# Patient Record
Sex: FEMALE | Race: BLACK OR AFRICAN AMERICAN | NOT HISPANIC OR LATINO | Employment: OTHER | URBAN - METROPOLITAN AREA
[De-identification: names, ages, dates, MRNs, and addresses within clinical notes are randomized per-mention and may not be internally consistent; named-entity substitution may affect disease eponyms.]

---

## 2018-08-30 ENCOUNTER — HOSPITAL ENCOUNTER (EMERGENCY)
Facility: HOSPITAL | Age: 38
Discharge: HOME | End: 2018-08-30
Attending: EMERGENCY MEDICINE
Payer: COMMERCIAL

## 2018-08-30 VITALS
HEART RATE: 80 BPM | DIASTOLIC BLOOD PRESSURE: 68 MMHG | SYSTOLIC BLOOD PRESSURE: 115 MMHG | BODY MASS INDEX: 42.65 KG/M2 | RESPIRATION RATE: 18 BRPM | OXYGEN SATURATION: 100 % | TEMPERATURE: 98 F | HEIGHT: 64 IN | WEIGHT: 249.81 LBS

## 2018-08-30 DIAGNOSIS — K59.00 CONSTIPATION, UNSPECIFIED CONSTIPATION TYPE: Primary | ICD-10-CM

## 2018-08-30 DIAGNOSIS — R10.84 GENERALIZED ABDOMINAL PAIN: ICD-10-CM

## 2018-08-30 LAB
B-HCG UR QL: NEGATIVE
BILIRUB UR QL STRIP: NEGATIVE
CLARITY UR: CLEAR
COLOR UR: YELLOW
GLUCOSE UR QL STRIP: NEGATIVE
HGB UR QL STRIP: ABNORMAL
KETONES UR QL STRIP: ABNORMAL
LEUKOCYTE ESTERASE UR QL STRIP: NEGATIVE
NITRITE UR QL STRIP: NEGATIVE
PH UR STRIP: 6 [PH] (ref 5–8)
PROT UR QL STRIP: NEGATIVE
SP GR UR STRIP: 1.02 (ref 1–1.03)
URN SPEC COLLECT METH UR: ABNORMAL
UROBILINOGEN UR STRIP-ACNC: 1 EU/DL

## 2018-08-30 PROCEDURE — 99284 EMERGENCY DEPT VISIT MOD MDM: CPT | Mod: 25

## 2018-08-30 PROCEDURE — 81003 URINALYSIS AUTO W/O SCOPE: CPT

## 2018-08-30 PROCEDURE — 81025 URINE PREGNANCY TEST: CPT

## 2018-08-30 RX ORDER — POLYETHYLENE GLYCOL 3350 17 G/17G
17 POWDER, FOR SOLUTION ORAL DAILY
Qty: 238 G | Refills: 0 | Status: ON HOLD | OUTPATIENT
Start: 2018-08-30 | End: 2018-09-10 | Stop reason: HOSPADM

## 2018-08-30 RX ORDER — DOCUSATE SODIUM 100 MG/1
100 CAPSULE, LIQUID FILLED ORAL 2 TIMES DAILY
Qty: 60 CAPSULE | Refills: 0 | Status: SHIPPED | OUTPATIENT
Start: 2018-08-30

## 2018-08-30 RX ORDER — OLMESARTAN MEDOXOMIL AND HYDROCHLOROTHIAZIDE 40/25 40; 25 MG/1; MG/1
1 TABLET ORAL DAILY
COMMUNITY

## 2018-08-30 NOTE — ED PROVIDER NOTES
SCRIBE #1 NOTE: I, Gertrude Mittal, am scribing for, and in the presence of, Madelin Nation PA-C. I have scribed the entire note.      History      Chief Complaint   Patient presents with    Abdominal Pain     pt c/o generalized abd pain, 1 episode vomiting, last BM today, denies dysuria       Review of patient's allergies indicates:  No Known Allergies     HPI   HPI    2018, 3:52 PM   History obtained from the patient      History of Present Illness: Lulú Seth is a 37 y.o. female patient who presents to the Emergency Department for abd pain which onset gradually 12 hours PTA. Pt describes the pain as a cramp. Pt states she had nachos for dinner last night. Symptoms are constant and moderate in severity. No mitigating or exacerbating factors reported. Associated sxs include 1 episode of N/V. Patient denies any fever, chills, diarrhea, hematochezia, constipation, hematuria, dysuria, difficulty urinating, and all other sxs at this time. No prior tx. No further complaints or concerns at this time.       Arrival mode: Personal vehicle     PCP: [facility]       Past Medical History:  Past Medical History:   Diagnosis Date    Hypertension        Past Surgical History:  Past Surgical History:   Procedure Laterality Date     SECTION, CLASSIC      x1    DILATION AND CURETTAGE OF UTERUS           Family History:  Family History   Problem Relation Age of Onset    Hypertension Unknown     Diabetes Unknown        Social History:  Social History     Tobacco Use    Smoking status: Never Smoker    Smokeless tobacco: Never Used   Substance and Sexual Activity    Alcohol use: Yes     Comment: occasion     Drug use: No    Sexual activity: Yes     Partners: Male       ROS   Review of Systems   Constitutional: Negative for chills, diaphoresis and fever.   HENT: Negative for congestion, rhinorrhea and sore throat.    Respiratory: Negative for shortness of breath.    Cardiovascular: Negative  "for chest pain.   Gastrointestinal: Positive for abdominal pain, nausea and vomiting. Negative for blood in stool, constipation and diarrhea.   Genitourinary: Negative for difficulty urinating, dysuria, frequency and hematuria.   Musculoskeletal: Negative for back pain and neck pain.   Skin: Negative for rash.   Neurological: Negative for dizziness, weakness, numbness and headaches.   Hematological: Does not bruise/bleed easily.     Physical Exam      Initial Vitals [08/30/18 1541]   BP Pulse Resp Temp SpO2   (!) 111/54 67 16 98.2 °F (36.8 °C) 97 %      MAP       --          Physical Exam  Nursing Notes and Vital Signs Reviewed.  Constitutional: Patient is in no acute distress. Well-developed and well-nourished.  Head: Atraumatic. Normocephalic.  Eyes: PERRL. EOM intact. Conjunctivae are not pale. No scleral icterus.  ENT: Mucous membranes are moist. Oropharynx is clear and symmetric.    Neck: Supple. Full ROM. No lymphadenopathy.  Cardiovascular: Regular rate. Regular rhythm. No murmurs, rubs, or gallops. Distal pulses are 2+ and symmetric.  Pulmonary/Chest: No respiratory distress. Clear to auscultation bilaterally. No wheezing or rales.  Abdominal: Soft and non-distended.  There is no tenderness.  No rebound, guarding, or rigidity.   Genitourinary: No CVA tenderness.  Rectal:  Patient deferred.  Musculoskeletal: Moves all extremities. No obvious deformities. No edema.  Skin: Warm and dry.  Neurological:  Alert, awake, and appropriate.  Normal speech.  No acute focal neurological deficits are appreciated.  Psychiatric: Normal affect. Good eye contact. Appropriate in content.    ED Course    Procedures  ED Vital Signs:  Vitals:    08/30/18 1541 08/30/18 1748   BP: (!) 111/54 115/68   Pulse: 67 80   Resp: 16 18   Temp: 98.2 °F (36.8 °C)    TempSrc: Oral    SpO2: 97% 100%   Weight: 113.3 kg (249 lb 12.5 oz)    Height: 5' 4" (1.626 m)        Abnormal Lab Results:  Labs Reviewed   URINALYSIS, REFLEX TO URINE CULTURE - " Abnormal; Notable for the following components:       Result Value    Ketones, UA Trace (*)     Occult Blood UA Trace (*)     All other components within normal limits    Narrative:     Preferred Collection Type->Urine, Clean Catch   PREGNANCY TEST, URINE RAPID        All Lab Results:  Results for orders placed or performed during the hospital encounter of 08/30/18   Urinalysis, Reflex to Urine Culture Urine, Clean Catch   Result Value Ref Range    Specimen UA Urine, Clean Catch     Color, UA Yellow Yellow, Straw, Patsy    Appearance, UA Clear Clear    pH, UA 6.0 5.0 - 8.0    Specific Gravity, UA 1.025 1.005 - 1.030    Protein, UA Negative Negative    Glucose, UA Negative Negative    Ketones, UA Trace (A) Negative    Bilirubin (UA) Negative Negative    Occult Blood UA Trace (A) Negative    Nitrite, UA Negative Negative    Urobilinogen, UA 1.0 <2.0 EU/dL    Leukocytes, UA Negative Negative   Rapid Pregnancy, Urine   Result Value Ref Range    Preg Test, Ur Negative          Imaging Results:  Imaging Results          X-Ray Abdomen Flat And Erect (Final result)  Result time 08/30/18 17:24:30    Final result by Andres Menchaca MD (08/30/18 17:24:30)                 Impression:      Moderate constipation.      Electronically signed by: Andres Menchaca MD  Date:    08/30/2018  Time:    17:24             Narrative:    EXAMINATION:  XR ABDOMEN FLAT AND ERECT    CLINICAL HISTORY:  Generalized abdominal pain    TECHNIQUE:  3 View of the abdomen was performed.    COMPARISON:  None    FINDINGS:  Nonobstructive bowel gas pattern.  Moderate constipation.    No obvious free air.  No portal venous gas.    No acute fracture. Extensive pelvic phleboliths.  Lung bases are clear.                                        The Emergency Provider reviewed the vital signs and test results, which are outlined above.    ED Discussion     5:36 PM Reassessed pt at this time. Pt declined rectal exam at this time. Pt is awake, alert, and in NAD at  this time. Discussed with pt all pertinent ED information and results. Discussed pt dx and plan of tx. Gave pt all f/u and return to the ED instructions. All questions and concerns were addressed at this time. Pt expresses understanding of information and instructions, and is comfortable with plan to discharge. Pt is stable for discharge.    I discussed with patient and/or family/caretaker that evaluation in the ED does not suggest any emergent or life threatening medical conditions requiring immediate intervention beyond what was provided in the ED, and I believe patient is safe for discharge.  Regardless, an unremarkable evaluation in the ED does not preclude the development or presence of a serious of life threatening condition. As such, patient was instructed to return immediately for any worsening or change in current symptoms.      ED Medication(s):  Medications - No data to display    Discharge Medication List as of 8/30/2018  5:41 PM      START taking these medications    Details   docusate sodium (COLACE) 100 MG capsule Take 1 capsule (100 mg total) by mouth 2 (two) times daily., Starting u 8/30/2018, Normal      polyethylene glycol (GLYCOLAX) 17 gram/dose powder Take 17 g by mouth once daily., Starting Thu 8/30/2018, Normal             Follow-up Information     [facility]. Schedule an appointment as soon as possible for a visit in 3 days.                   Medical Decision Making    Medical Decision Making:   Clinical Tests:   Lab Tests: Ordered and Reviewed  Radiological Study: Ordered and Reviewed           Scribe Attestation:   Scribe #1: I performed the above scribed service and the documentation accurately describes the services I performed. I attest to the accuracy of the note.    Attending:   Physician Attestation Statement for Scribe #1: I, Madelin Nation PA-C, personally performed the services described in this documentation, as scribed by Gertrude Mittal, in my presence, and it is both  accurate and complete.          Clinical Impression       ICD-10-CM ICD-9-CM   1. Constipation, unspecified constipation type K59.00 564.00   2. Generalized abdominal pain R10.84 789.07       Disposition:   Disposition: Discharged  Condition: Stable         Madelin Nation PA-C  09/01/18 1306

## 2018-09-07 ENCOUNTER — HOSPITAL ENCOUNTER (INPATIENT)
Facility: HOSPITAL | Age: 38
LOS: 3 days | Discharge: HOME HEALTH | DRG: 391 | End: 2018-09-10
Attending: EMERGENCY MEDICINE | Admitting: SURGERY
Payer: COMMERCIAL

## 2018-09-07 DIAGNOSIS — R10.30 LOWER ABDOMINAL PAIN: ICD-10-CM

## 2018-09-07 DIAGNOSIS — I10 ESSENTIAL HYPERTENSION: ICD-10-CM

## 2018-09-07 DIAGNOSIS — E87.6 HYPOKALEMIA: ICD-10-CM

## 2018-09-07 DIAGNOSIS — K57.20 DIVERTICULITIS OF COLON WITH PERFORATION: ICD-10-CM

## 2018-09-07 DIAGNOSIS — K57.20 DIVERTICULITIS OF LARGE INTESTINE WITH PERFORATION WITHOUT BLEEDING: Primary | ICD-10-CM

## 2018-09-07 DIAGNOSIS — K65.1 PERITONEAL ABSCESS: ICD-10-CM

## 2018-09-07 DIAGNOSIS — K65.1 INTRA-ABDOMINAL ABSCESS: ICD-10-CM

## 2018-09-07 PROBLEM — N73.9 PELVIC ABSCESS IN FEMALE: Status: ACTIVE | Noted: 2018-09-07

## 2018-09-07 LAB
ALBUMIN SERPL BCP-MCNC: 2.3 G/DL
ALP SERPL-CCNC: 169 U/L
ALT SERPL W/O P-5'-P-CCNC: 30 U/L
ANION GAP SERPL CALC-SCNC: 11 MMOL/L
ANION GAP SERPL CALC-SCNC: 14 MMOL/L
ANISOCYTOSIS BLD QL SMEAR: SLIGHT
AST SERPL-CCNC: 19 U/L
BASOPHILS # BLD AUTO: 0.03 K/UL
BASOPHILS NFR BLD: 0.2 %
BILIRUB SERPL-MCNC: 0.3 MG/DL
BUN SERPL-MCNC: 14 MG/DL
BUN SERPL-MCNC: 17 MG/DL
CALCIUM SERPL-MCNC: 8.7 MG/DL
CALCIUM SERPL-MCNC: 8.9 MG/DL
CHLORIDE SERPL-SCNC: 97 MMOL/L
CHLORIDE SERPL-SCNC: 99 MMOL/L
CO2 SERPL-SCNC: 27 MMOL/L
CO2 SERPL-SCNC: 31 MMOL/L
CREAT SERPL-MCNC: 1 MG/DL
CREAT SERPL-MCNC: 1.1 MG/DL
DIFFERENTIAL METHOD: ABNORMAL
EOSINOPHIL # BLD AUTO: 0.2 K/UL
EOSINOPHIL NFR BLD: 0.9 %
ERYTHROCYTE [DISTWIDTH] IN BLOOD BY AUTOMATED COUNT: 17.1 %
EST. GFR  (AFRICAN AMERICAN): >60 ML/MIN/1.73 M^2
EST. GFR  (AFRICAN AMERICAN): >60 ML/MIN/1.73 M^2
EST. GFR  (NON AFRICAN AMERICAN): >60 ML/MIN/1.73 M^2
EST. GFR  (NON AFRICAN AMERICAN): >60 ML/MIN/1.73 M^2
GLUCOSE SERPL-MCNC: 105 MG/DL
GLUCOSE SERPL-MCNC: 117 MG/DL
GRAM STN SPEC: NORMAL
HCT VFR BLD AUTO: 25.9 %
HGB BLD-MCNC: 8.3 G/DL
LIPASE SERPL-CCNC: 14 U/L
LYMPHOCYTES # BLD AUTO: 1.9 K/UL
LYMPHOCYTES NFR BLD: 10.5 %
MAGNESIUM SERPL-MCNC: 2.7 MG/DL
MCH RBC QN AUTO: 24.5 PG
MCHC RBC AUTO-ENTMCNC: 32 G/DL
MCV RBC AUTO: 76 FL
MONOCYTES # BLD AUTO: 1.7 K/UL
MONOCYTES NFR BLD: 9.5 %
NEUTROPHILS # BLD AUTO: 13.9 K/UL
NEUTROPHILS NFR BLD: 79.7 %
OVALOCYTES BLD QL SMEAR: ABNORMAL
PLATELET # BLD AUTO: 534 K/UL
PMV BLD AUTO: 8.9 FL
POCT GLUCOSE: 117 MG/DL (ref 70–110)
POIKILOCYTOSIS BLD QL SMEAR: SLIGHT
POTASSIUM SERPL-SCNC: 2.6 MMOL/L
POTASSIUM SERPL-SCNC: 3.1 MMOL/L
PROT SERPL-MCNC: 8 G/DL
RBC # BLD AUTO: 3.39 M/UL
SODIUM SERPL-SCNC: 138 MMOL/L
SODIUM SERPL-SCNC: 141 MMOL/L
WBC # BLD AUTO: 17.55 K/UL

## 2018-09-07 PROCEDURE — 82962 GLUCOSE BLOOD TEST: CPT

## 2018-09-07 PROCEDURE — 25000003 PHARM REV CODE 250: Performed by: NURSE PRACTITIONER

## 2018-09-07 PROCEDURE — 80048 BASIC METABOLIC PNL TOTAL CA: CPT

## 2018-09-07 PROCEDURE — 96361 HYDRATE IV INFUSION ADD-ON: CPT

## 2018-09-07 PROCEDURE — 87076 CULTURE ANAEROBE IDENT EACH: CPT

## 2018-09-07 PROCEDURE — S5010 5% DEXTROSE AND 0.45% SALINE: HCPCS | Performed by: NURSE PRACTITIONER

## 2018-09-07 PROCEDURE — 25000003 PHARM REV CODE 250: Performed by: SURGERY

## 2018-09-07 PROCEDURE — 87205 SMEAR GRAM STAIN: CPT

## 2018-09-07 PROCEDURE — 99900037 HC PT THERAPY SCREENING (STAT)

## 2018-09-07 PROCEDURE — 99222 1ST HOSP IP/OBS MODERATE 55: CPT | Mod: ,,, | Performed by: SURGERY

## 2018-09-07 PROCEDURE — 87070 CULTURE OTHR SPECIMN AEROBIC: CPT

## 2018-09-07 PROCEDURE — 83735 ASSAY OF MAGNESIUM: CPT

## 2018-09-07 PROCEDURE — 85025 COMPLETE CBC W/AUTO DIFF WBC: CPT

## 2018-09-07 PROCEDURE — 96365 THER/PROPH/DIAG IV INF INIT: CPT

## 2018-09-07 PROCEDURE — 87075 CULTR BACTERIA EXCEPT BLOOD: CPT

## 2018-09-07 PROCEDURE — 99285 EMERGENCY DEPT VISIT HI MDM: CPT | Mod: 25

## 2018-09-07 PROCEDURE — 96375 TX/PRO/DX INJ NEW DRUG ADDON: CPT

## 2018-09-07 PROCEDURE — 63600175 PHARM REV CODE 636 W HCPCS: Performed by: NURSE PRACTITIONER

## 2018-09-07 PROCEDURE — 80053 COMPREHEN METABOLIC PANEL: CPT

## 2018-09-07 PROCEDURE — 87186 SC STD MICRODIL/AGAR DIL: CPT

## 2018-09-07 PROCEDURE — 63600175 PHARM REV CODE 636 W HCPCS: Performed by: RADIOLOGY

## 2018-09-07 PROCEDURE — 0W9G30Z DRAINAGE OF PERITONEAL CAVITY WITH DRAINAGE DEVICE, PERCUTANEOUS APPROACH: ICD-10-PCS | Performed by: RADIOLOGY

## 2018-09-07 PROCEDURE — 11000001 HC ACUTE MED/SURG PRIVATE ROOM

## 2018-09-07 PROCEDURE — 83690 ASSAY OF LIPASE: CPT

## 2018-09-07 PROCEDURE — 87077 CULTURE AEROBIC IDENTIFY: CPT | Mod: 59

## 2018-09-07 PROCEDURE — 36415 COLL VENOUS BLD VENIPUNCTURE: CPT

## 2018-09-07 RX ORDER — POTASSIUM CHLORIDE 20 MEQ/1
60 TABLET, EXTENDED RELEASE ORAL ONCE
Status: COMPLETED | OUTPATIENT
Start: 2018-09-07 | End: 2018-09-07

## 2018-09-07 RX ORDER — HYDROCODONE BITARTRATE AND ACETAMINOPHEN 5; 325 MG/1; MG/1
2 TABLET ORAL EVERY 4 HOURS PRN
Status: DISCONTINUED | OUTPATIENT
Start: 2018-09-07 | End: 2018-09-10 | Stop reason: HOSPADM

## 2018-09-07 RX ORDER — ACETAMINOPHEN 325 MG/1
650 TABLET ORAL EVERY 4 HOURS PRN
Status: DISCONTINUED | OUTPATIENT
Start: 2018-09-07 | End: 2018-09-10 | Stop reason: HOSPADM

## 2018-09-07 RX ORDER — RAMELTEON 8 MG/1
8 TABLET ORAL NIGHTLY PRN
Status: DISCONTINUED | OUTPATIENT
Start: 2018-09-07 | End: 2018-09-10 | Stop reason: HOSPADM

## 2018-09-07 RX ORDER — ONDANSETRON 2 MG/ML
4 INJECTION INTRAMUSCULAR; INTRAVENOUS EVERY 12 HOURS PRN
Status: DISCONTINUED | OUTPATIENT
Start: 2018-09-07 | End: 2018-09-10 | Stop reason: HOSPADM

## 2018-09-07 RX ORDER — LIDOCAINE HYDROCHLORIDE 10 MG/ML
1 INJECTION, SOLUTION EPIDURAL; INFILTRATION; INTRACAUDAL; PERINEURAL ONCE
Status: DISCONTINUED | OUTPATIENT
Start: 2018-09-07 | End: 2018-09-10 | Stop reason: HOSPADM

## 2018-09-07 RX ORDER — HYDRALAZINE HYDROCHLORIDE 20 MG/ML
10 INJECTION INTRAMUSCULAR; INTRAVENOUS EVERY 6 HOURS PRN
Status: DISCONTINUED | OUTPATIENT
Start: 2018-09-07 | End: 2018-09-10 | Stop reason: HOSPADM

## 2018-09-07 RX ORDER — DIPHENHYDRAMINE HYDROCHLORIDE 50 MG/ML
25 INJECTION INTRAMUSCULAR; INTRAVENOUS EVERY 4 HOURS PRN
Status: DISCONTINUED | OUTPATIENT
Start: 2018-09-07 | End: 2018-09-10 | Stop reason: HOSPADM

## 2018-09-07 RX ORDER — MIDAZOLAM HYDROCHLORIDE 1 MG/ML
INJECTION INTRAMUSCULAR; INTRAVENOUS CODE/TRAUMA/SEDATION MEDICATION
Status: COMPLETED | OUTPATIENT
Start: 2018-09-07 | End: 2018-09-07

## 2018-09-07 RX ORDER — SODIUM CHLORIDE 9 MG/ML
1000 INJECTION, SOLUTION INTRAVENOUS
Status: COMPLETED | OUTPATIENT
Start: 2018-09-07 | End: 2018-09-07

## 2018-09-07 RX ORDER — SODIUM CHLORIDE, SODIUM LACTATE, POTASSIUM CHLORIDE, CALCIUM CHLORIDE 600; 310; 30; 20 MG/100ML; MG/100ML; MG/100ML; MG/100ML
INJECTION, SOLUTION INTRAVENOUS CONTINUOUS
Status: DISCONTINUED | OUTPATIENT
Start: 2018-09-07 | End: 2018-09-08

## 2018-09-07 RX ORDER — ENOXAPARIN SODIUM 100 MG/ML
40 INJECTION SUBCUTANEOUS EVERY 24 HOURS
Status: DISCONTINUED | OUTPATIENT
Start: 2018-09-08 | End: 2018-09-10 | Stop reason: HOSPADM

## 2018-09-07 RX ORDER — MORPHINE SULFATE 4 MG/ML
2 INJECTION, SOLUTION INTRAMUSCULAR; INTRAVENOUS
Status: DISCONTINUED | OUTPATIENT
Start: 2018-09-07 | End: 2018-09-10 | Stop reason: HOSPADM

## 2018-09-07 RX ORDER — FENTANYL CITRATE 50 UG/ML
INJECTION, SOLUTION INTRAMUSCULAR; INTRAVENOUS CODE/TRAUMA/SEDATION MEDICATION
Status: COMPLETED | OUTPATIENT
Start: 2018-09-07 | End: 2018-09-07

## 2018-09-07 RX ORDER — GLUCAGON 1 MG
1 KIT INJECTION
Status: DISCONTINUED | OUTPATIENT
Start: 2018-09-07 | End: 2018-09-10 | Stop reason: HOSPADM

## 2018-09-07 RX ADMIN — SODIUM CHLORIDE 1000 ML: 0.9 INJECTION, SOLUTION INTRAVENOUS at 09:09

## 2018-09-07 RX ADMIN — MIDAZOLAM HYDROCHLORIDE 1 MG: 1 INJECTION, SOLUTION INTRAMUSCULAR; INTRAVENOUS at 02:09

## 2018-09-07 RX ADMIN — POTASSIUM CHLORIDE 60 MEQ: 1500 TABLET, EXTENDED RELEASE ORAL at 10:09

## 2018-09-07 RX ADMIN — PIPERACILLIN AND TAZOBACTAM 4.5 G: 4; .5 INJECTION, POWDER, LYOPHILIZED, FOR SOLUTION INTRAVENOUS; PARENTERAL at 11:09

## 2018-09-07 RX ADMIN — FENTANYL CITRATE 50 MCG: 50 INJECTION, SOLUTION INTRAMUSCULAR; INTRAVENOUS at 02:09

## 2018-09-07 RX ADMIN — POTASSIUM CHLORIDE: 2 INJECTION, SOLUTION, CONCENTRATE INTRAVENOUS at 12:09

## 2018-09-07 RX ADMIN — HYDROCODONE BITARTRATE AND ACETAMINOPHEN 2 TABLET: 5; 325 TABLET ORAL at 04:09

## 2018-09-07 RX ADMIN — SODIUM CHLORIDE, SODIUM LACTATE, POTASSIUM CHLORIDE, AND CALCIUM CHLORIDE: .6; .31; .03; .02 INJECTION, SOLUTION INTRAVENOUS at 01:09

## 2018-09-07 RX ADMIN — HYDROCODONE BITARTRATE AND ACETAMINOPHEN 2 TABLET: 5; 325 TABLET ORAL at 11:09

## 2018-09-07 NOTE — SEDATION DOCUMENTATION
Pt lying on ct table prone with bilateral arms above head. Pt vss, cardiac monitoring in place. Pt verbalized understanding and all questions answered.

## 2018-09-07 NOTE — ED NOTES
From or to room. Ricardo drain located low back region on left lumbar area, secured, no drainage, no seepage. Patient lying on right side in no discomfort, pt states that she is in no pain at this time.

## 2018-09-07 NOTE — ASSESSMENT & PLAN NOTE
Admit for perforated diverticulitis with intra-abdominal abscess less likely perforated appendicitis  NPO, IV fluids, IV antibiotics  IR drainage of pelvic abscess  Discussed with patient that if condition worsens or fails to improve may require surgery. Risks and benefits of observation as well as surgery discussed with patient. Patient agrees with plan.

## 2018-09-07 NOTE — HPI
37-year-old female referred for pelvic abscess.  The patient reports lower pelvic abdominal pain for a week.  She endorses intermittent constipation with passing some liquid bowel movements.  She denies any fevers, chills, urinary difficulties.

## 2018-09-07 NOTE — SUBJECTIVE & OBJECTIVE
Past Medical History:   Diagnosis Date    Hypertension        Past Surgical History:   Procedure Laterality Date     SECTION, CLASSIC      x1    DILATION AND CURETTAGE OF UTERUS         Review of patient's allergies indicates:  No Known Allergies    No current facility-administered medications on file prior to encounter.      Current Outpatient Medications on File Prior to Encounter   Medication Sig    docusate sodium (COLACE) 100 MG capsule Take 1 capsule (100 mg total) by mouth 2 (two) times daily.    olmesartan-hydrochlorothiazide (BENICAR HCT) 40-25 mg per tablet Take 1 tablet by mouth once daily.    polyethylene glycol (GLYCOLAX) 17 gram/dose powder Take 17 g by mouth once daily.     Family History     Problem Relation (Age of Onset)    Diabetes     Hypertension         Tobacco Use    Smoking status: Never Smoker    Smokeless tobacco: Never Used   Substance and Sexual Activity    Alcohol use: Yes     Comment: occasion     Drug use: No    Sexual activity: Yes     Partners: Male     Review of Systems   Constitutional: Positive for activity change. Negative for chills and fever.   HENT: Negative.    Eyes: Negative.    Respiratory: Negative for apnea, cough, choking, chest tightness, shortness of breath, wheezing and stridor.    Cardiovascular: Negative for chest pain, palpitations and leg swelling.   Gastrointestinal: Negative for abdominal pain, constipation, diarrhea, nausea and vomiting.   Endocrine: Negative.    Genitourinary: Negative.    Musculoskeletal: Negative for arthralgias, back pain, gait problem, myalgias, neck pain and neck stiffness.   Skin: Negative.    Allergic/Immunologic: Negative.    Neurological: Negative for dizziness, tremors, seizures, syncope, facial asymmetry, speech difficulty, weakness, light-headedness, numbness and headaches.   Hematological: Negative.    Psychiatric/Behavioral: Negative.      Objective:     Vital Signs (Most Recent):  Temp: 98.4 °F (36.9 °C)  (09/07/18 1544)  Pulse: 81 (09/07/18 1630)  Resp: 16 (09/07/18 1630)  BP: (!) 103/52 (09/07/18 1630)  SpO2: 99 % (09/07/18 1630) Vital Signs (24h Range):  Temp:  [98.3 °F (36.8 °C)-98.4 °F (36.9 °C)] 98.4 °F (36.9 °C)  Pulse:  [70-94] 81  Resp:  [11-24] 16  SpO2:  [94 %-100 %] 99 %  BP: (103-138)/(52-67) 103/52     Weight: 118 kg (260 lb 2.6 oz)  Body mass index is 44.66 kg/m².    Physical Exam   Constitutional: She is oriented to person, place, and time. She appears well-developed and well-nourished. She is cooperative. She is easily aroused. No distress.   HENT:   Head: Normocephalic and atraumatic.   Eyes: EOM are normal.   Neck: Normal range of motion. Neck supple.   Cardiovascular: Normal rate, regular rhythm, normal heart sounds and intact distal pulses.   No murmur heard.  Pulmonary/Chest: Effort normal and breath sounds normal. No stridor. No respiratory distress. She has no wheezes. She has no rales. She exhibits no tenderness.   Abdominal: Soft. Bowel sounds are normal. There is tenderness (mild) in the right lower quadrant and left upper quadrant.   Genitourinary:   Genitourinary Comments: Deferred   Musculoskeletal: She exhibits no edema, tenderness or deformity.   Neurological: She is alert, oriented to person, place, and time and easily aroused. No sensory deficit.   Skin: Skin is warm and dry. Capillary refill takes less than 2 seconds.        Psychiatric: She has a normal mood and affect. Her behavior is normal. Judgment and thought content normal.   Nursing note and vitals reviewed.      Significant Labs:   CBC:   Recent Labs   Lab  09/07/18   0859   WBC  17.55*   HGB  8.3*   HCT  25.9*   PLT  534*     CMP:   Recent Labs   Lab  09/07/18   0859   NA  138   K  2.6*   CL  97   CO2  27   GLU  117*   BUN  17   CREATININE  1.1   CALCIUM  8.9   PROT  8.0   ALBUMIN  2.3*   BILITOT  0.3   ALKPHOS  169*   AST  19   ALT  30   ANIONGAP  14   EGFRNONAA  >60     Lipase:   Recent Labs   Lab  09/07/18   0859    LIPASE  14     POCT Glucose:   Recent Labs   Lab  09/07/18   1310   POCTGLUCOSE  117*       Significant Imaging:   Imaging Results          CT Guided Abscess Drainage With Catheter (Final result)  Result time 09/07/18 16:23:47    Final result by Andres Menchaca MD (09/07/18 16:23:47)                 Impression:      CT guided abscess strain as above.  Follow-up cultures    All CT scans at this facility use dose modulation, iterative reconstruction, and/or weight based dosing when appropriate to reduce radiation dose to as low as reasonable achievable.      Electronically signed by: Andres Menchaca MD  Date:    09/07/2018  Time:    16:23             Narrative:    EXAMINATION:  CT GUIDED ABSCESS DRAINAGE WITH CATHETER    CLINICAL HISTORY:  Intra abdominal abscess;    TECHNIQUE:  Medications:    Moderate sedation using versed and fentanyl was provided with and trained observer monitoring the patient's vital signs and level of consciousness. The total time of sedation was 30 min.    1% lidocaine locally    :YOVANI Menchaca    Complications: None    COMPARISON:  09/07/2018    FINDINGS:  Procedure: The risks and benefits of this procedure were discussed with the patient, written informed consent was obtained.  The patient was placed supine on the CT gantry.  Preprocedural imaging revealed cul de sac fluid collection.  A suitable skin site for drainage was selected.  IV fentanyl and Versed was given for conscious sedation.  The skin site was prepped and draped in sterile fashion.  The skin was anesthetized with 1% lidocaine.    Using CT guidance a 19-gauge introducer needle was guided into the collection via a posterior approach with the patient in prone position.  Prior to wire placement appropriate needle positioning was confirmed with CT.   Wire was advanced within the collection.  Images demonstrated appropriate positioning.  Tract was dilated to 8 Portuguese and an 8.5 Portuguese APD catheter was placed within the  collection.  There is return of approximately 30 cc of clarissa pus.  Catheter was secured at the skin surface..    Postprocedural imaging was acquired. The site was bandaged sterilely. The patient left the room in stable condition.    Findings:    1.  Preprocedural CT showed cul de sac fluid collection with thick wall.    2.  CT-guided drainage    3.  Post procedural CT showed no complication and appropriate positioning of the drainage catheter.                               CT Renal Stone Study Abd Pelvis WO (Final result)  Result time 09/07/18 10:51:06    Final result by DIMPLE Greer Sr., MD (09/07/18 10:51:06)                 Impression:      1. There is a poorly visualized fluid collection between the uterus and the rectum. This fluid collection measures approximately 4 cm in craniocaudal dimension. There is no gas visualized within this fluid collection. In the mesenteric portion of the inferior aspect of the abdomen and in the pelvis  there are several fluid and gas collections that have moderate adjacent inflammatory changes. The larger 1 in this area measures 4 cm.  These findings are worrisome for abscesses.  The examination is limited secondary to the lack of oral and IV contrast.  2. There is a mild amount of alveolar consolidation scattered throughout the left lower lobe.  This is characteristic of pneumonia.  3. There is a 3 mm nonobstructive stone in the inferior pole of the left kidney.  4. The liver is enlarged. It measures 22.2 cm in craniocaudal dimension.  5. There is a 3 mm nonobstructive stone in the inferior pole of the left kidney.  6. The uterus and ovaries were not well seen.  7. There is a moderate size umbilical hernia. The width of the mouth of this hernia measures 51 mm. A segment of the transverse portion of the colon is in the region of the mouth of this hernia.  8. The above findings and impressions were discussed with Dr. Costello via the telephone at 10:40 on 09/07/2018.  All CT  scans at this facility use dose modulation, iterative reconstruction, and/or weight base dosing when appropriate to reduce radiation dose when appropriate to reduce radiation dose to as low as reasonably achievable.      Electronically signed by: Raghavendra Greer MD  Date:    09/07/2018  Time:    10:51             Narrative:    EXAMINATION:  CT RENAL STONE STUDY ABD PELVIS WO    CLINICAL HISTORY:  Abdominal pain, unspecified;    TECHNIQUE:  Standard abdomen and pelvis CT protocol without oral or IV contrast was performed.    FINDINGS:  Finding: The size of the heart is within normal limits.  There is a mild amount of alveolar consolidation scattered throughout the left lower lobe.  The visualized portion of the right lung is clear.  There is no pneumothorax or pleural effusion.    The liver is enlarged.  It measures 22.2 cm in craniocaudal dimension.  The gallbladder, pancreas, spleen, adrenals, and right kidney are normal in appearance.  There is a 3 mm nonobstructive stone in the inferior pole of the left kidney.  The ureters and the urinary bladder are normal in appearance. The appendix is not visualized.  There is a poorly visualized fluid collection between the uterus and the rectum.  This fluid collection measures approximately 4 cm in craniocaudal dimension.  There is no gas visualized within this fluid collection.  In the mesenteric portion of the inferior aspect of the abdomen and in the pelvis there are several fluid and gas collections that have moderate adjacent inflammatory changes.  The larger 1 in this area measures 4 cm.  There is no free fluid within the abdomen or pelvis. There is no pneumoperitoneum.  The uterus and ovaries were not well seen.  There is a moderate size umbilical hernia.  The width of the mouth of this hernia measures 51 mm.  A segment of the transverse portion of the colon is in the region of the mouth of this hernia.

## 2018-09-07 NOTE — ASSESSMENT & PLAN NOTE
- Due to borderline hypotension, will hold Benicar-HCT at this time  - Will continue to monitor BP trends and restart medication once BP allows

## 2018-09-07 NOTE — CONSULTS
[facility] Hospital Medicine  Consult Note    Patient Name: Lulú Seth  MRN: [id number]  Admission Date: 2018  Hospital Length of Stay: 0 days  Attending Physician: Daniel Romeo MD   Primary Care Provider: Provider [employer]           Patient information was obtained from patient and ER records.     Inpatient consult to Internal Medicine  Consult performed by: LYNDON Khoury  Consult ordered by: Daniel Romeo MD  Reason for consult: Medical management        Subjective:     Principal Problem: Diverticulitis of colon with perforation    Chief Complaint:   Chief Complaint   Patient presents with    Constipation     pt states she was seen here for constipation last week and has not had a BM since then; has taken OTC meds with no relief        HPI: Lulú Seth is a 37 year old female admitted by General surgery for perforated diverticulitis with intra abdominal abscess. IR was consulted by General surgery today for drainage of abscess -- 8.3 Maltese drainage catheter placed and approx 45 ccs of purulent fluid removed and sent to lab. She may require surgery if condition worsens or fails to improve with IVF and IV antibiotics. HM consulted for medical management of HTN. Pt currently takes Benicar-HCT 40-25 mg PO daily for HTN. Will hold antihypertensives for now due to borderline hypotension. Will continue to monitor and restart medication once BP allows.    Past Medical History:   Diagnosis Date    Hypertension        Past Surgical History:   Procedure Laterality Date     SECTION, CLASSIC      x1    DILATION AND CURETTAGE OF UTERUS         Review of patient's allergies indicates:  No Known Allergies    No current facility-administered medications on file prior to encounter.      Current Outpatient Medications on File Prior to Encounter   Medication Sig    docusate sodium (COLACE) 100 MG capsule Take 1 capsule (100 mg total) by mouth 2 (two) times daily.     olmesartan-hydrochlorothiazide (BENICAR HCT) 40-25 mg per tablet Take 1 tablet by mouth once daily.    polyethylene glycol (GLYCOLAX) 17 gram/dose powder Take 17 g by mouth once daily.     Family History     Problem Relation (Age of Onset)    Diabetes     Hypertension         Tobacco Use    Smoking status: Never Smoker    Smokeless tobacco: Never Used   Substance and Sexual Activity    Alcohol use: Yes     Comment: occasion     Drug use: No    Sexual activity: Yes     Partners: Male     Review of Systems   Constitutional: Positive for activity change. Negative for chills and fever.   HENT: Negative.    Eyes: Negative.    Respiratory: Negative for apnea, cough, choking, chest tightness, shortness of breath, wheezing and stridor.    Cardiovascular: Negative for chest pain, palpitations and leg swelling.   Gastrointestinal: Negative for abdominal pain, constipation, diarrhea, nausea and vomiting.   Endocrine: Negative.    Genitourinary: Negative.    Musculoskeletal: Negative for arthralgias, back pain, gait problem, myalgias, neck pain and neck stiffness.   Skin: Negative.    Allergic/Immunologic: Negative.    Neurological: Negative for dizziness, tremors, seizures, syncope, facial asymmetry, speech difficulty, weakness, light-headedness, numbness and headaches.   Hematological: Negative.    Psychiatric/Behavioral: Negative.      Objective:     Vital Signs (Most Recent):  Temp: 98.4 °F (36.9 °C) (09/07/18 1544)  Pulse: 81 (09/07/18 1630)  Resp: 16 (09/07/18 1630)  BP: (!) 103/52 (09/07/18 1630)  SpO2: 99 % (09/07/18 1630) Vital Signs (24h Range):  Temp:  [98.3 °F (36.8 °C)-98.4 °F (36.9 °C)] 98.4 °F (36.9 °C)  Pulse:  [70-94] 81  Resp:  [11-24] 16  SpO2:  [94 %-100 %] 99 %  BP: (103-138)/(52-67) 103/52     Weight: 118 kg (260 lb 2.6 oz)  Body mass index is 44.66 kg/m².    Physical Exam   Constitutional: She is oriented to person, place, and time. She appears well-developed and well-nourished. She is  cooperative. She is easily aroused. No distress.   HENT:   Head: Normocephalic and atraumatic.   Eyes: EOM are normal.   Neck: Normal range of motion. Neck supple.   Cardiovascular: Normal rate, regular rhythm, normal heart sounds and intact distal pulses.   No murmur heard.  Pulmonary/Chest: Effort normal and breath sounds normal. No stridor. No respiratory distress. She has no wheezes. She has no rales. She exhibits no tenderness.   Abdominal: Soft. Bowel sounds are normal. There is tenderness (mild) in the right lower quadrant and left upper quadrant.   Genitourinary:   Genitourinary Comments: Deferred   Musculoskeletal: She exhibits no edema, tenderness or deformity.   Neurological: She is alert, oriented to person, place, and time and easily aroused. No sensory deficit.   Skin: Skin is warm and dry. Capillary refill takes less than 2 seconds.        Psychiatric: She has a normal mood and affect. Her behavior is normal. Judgment and thought content normal.   Nursing note and vitals reviewed.      Significant Labs:   CBC:   Recent Labs   Lab  09/07/18   0859   WBC  17.55*   HGB  8.3*   HCT  25.9*   PLT  534*     CMP:   Recent Labs   Lab  09/07/18   0859   NA  138   K  2.6*   CL  97   CO2  27   GLU  117*   BUN  17   CREATININE  1.1   CALCIUM  8.9   PROT  8.0   ALBUMIN  2.3*   BILITOT  0.3   ALKPHOS  169*   AST  19   ALT  30   ANIONGAP  14   EGFRNONAA  >60     Lipase:   Recent Labs   Lab  09/07/18   0859   LIPASE  14     POCT Glucose:   Recent Labs   Lab  09/07/18   1310   POCTGLUCOSE  117*       Significant Imaging:   Imaging Results          CT Guided Abscess Drainage With Catheter (Final result)  Result time 09/07/18 16:23:47    Final result by Anrdes Menchaca MD (09/07/18 16:23:47)                 Impression:      CT guided abscess strain as above.  Follow-up cultures    All CT scans at this facility use dose modulation, iterative reconstruction, and/or weight based dosing when appropriate to reduce radiation  dose to as low as reasonable achievable.      Electronically signed by: Andres Menchaca MD  Date:    09/07/2018  Time:    16:23             Narrative:    EXAMINATION:  CT GUIDED ABSCESS DRAINAGE WITH CATHETER    CLINICAL HISTORY:  Intra abdominal abscess;    TECHNIQUE:  Medications:    Moderate sedation using versed and fentanyl was provided with and trained observer monitoring the patient's vital signs and level of consciousness. The total time of sedation was 30 min.    1% lidocaine locally    :YOVANI Menchaca    Complications: None    COMPARISON:  09/07/2018    FINDINGS:  Procedure: The risks and benefits of this procedure were discussed with the patient, written informed consent was obtained.  The patient was placed supine on the CT gantry.  Preprocedural imaging revealed cul de sac fluid collection.  A suitable skin site for drainage was selected.  IV fentanyl and Versed was given for conscious sedation.  The skin site was prepped and draped in sterile fashion.  The skin was anesthetized with 1% lidocaine.    Using CT guidance a 19-gauge introducer needle was guided into the collection via a posterior approach with the patient in prone position.  Prior to wire placement appropriate needle positioning was confirmed with CT.   Wire was advanced within the collection.  Images demonstrated appropriate positioning.  Tract was dilated to 8 Uzbek and an 8.5 Uzbek APD catheter was placed within the collection.  There is return of approximately 30 cc of clarissa pus.  Catheter was secured at the skin surface..    Postprocedural imaging was acquired. The site was bandaged sterilely. The patient left the room in stable condition.    Findings:    1.  Preprocedural CT showed cul de sac fluid collection with thick wall.    2.  CT-guided drainage    3.  Post procedural CT showed no complication and appropriate positioning of the drainage catheter.                               CT Renal Stone Study Abd Pelvis WO (Final  result)  Result time 09/07/18 10:51:06    Final result by DIMPLE Greer Sr., MD (09/07/18 10:51:06)                 Impression:      1. There is a poorly visualized fluid collection between the uterus and the rectum. This fluid collection measures approximately 4 cm in craniocaudal dimension. There is no gas visualized within this fluid collection. In the mesenteric portion of the inferior aspect of the abdomen and in the pelvis  there are several fluid and gas collections that have moderate adjacent inflammatory changes. The larger 1 in this area measures 4 cm.  These findings are worrisome for abscesses.  The examination is limited secondary to the lack of oral and IV contrast.  2. There is a mild amount of alveolar consolidation scattered throughout the left lower lobe.  This is characteristic of pneumonia.  3. There is a 3 mm nonobstructive stone in the inferior pole of the left kidney.  4. The liver is enlarged. It measures 22.2 cm in craniocaudal dimension.  5. There is a 3 mm nonobstructive stone in the inferior pole of the left kidney.  6. The uterus and ovaries were not well seen.  7. There is a moderate size umbilical hernia. The width of the mouth of this hernia measures 51 mm. A segment of the transverse portion of the colon is in the region of the mouth of this hernia.  8. The above findings and impressions were discussed with Dr. Costello via the telephone at 10:40 on 09/07/2018.  All CT scans at this facility use dose modulation, iterative reconstruction, and/or weight base dosing when appropriate to reduce radiation dose when appropriate to reduce radiation dose to as low as reasonably achievable.      Electronically signed by: Raghavendra Greer MD  Date:    09/07/2018  Time:    10:51             Narrative:    EXAMINATION:  CT RENAL STONE STUDY ABD PELVIS WO    CLINICAL HISTORY:  Abdominal pain, unspecified;    TECHNIQUE:  Standard abdomen and pelvis CT protocol without oral or IV contrast was  performed.    FINDINGS:  Finding: The size of the heart is within normal limits.  There is a mild amount of alveolar consolidation scattered throughout the left lower lobe.  The visualized portion of the right lung is clear.  There is no pneumothorax or pleural effusion.    The liver is enlarged.  It measures 22.2 cm in craniocaudal dimension.  The gallbladder, pancreas, spleen, adrenals, and right kidney are normal in appearance.  There is a 3 mm nonobstructive stone in the inferior pole of the left kidney.  The ureters and the urinary bladder are normal in appearance. The appendix is not visualized.  There is a poorly visualized fluid collection between the uterus and the rectum.  This fluid collection measures approximately 4 cm in craniocaudal dimension.  There is no gas visualized within this fluid collection.  In the mesenteric portion of the inferior aspect of the abdomen and in the pelvis there are several fluid and gas collections that have moderate adjacent inflammatory changes.  The larger 1 in this area measures 4 cm.  There is no free fluid within the abdomen or pelvis. There is no pneumoperitoneum.  The uterus and ovaries were not well seen.  There is a moderate size umbilical hernia.  The width of the mouth of this hernia measures 51 mm.  A segment of the transverse portion of the colon is in the region of the mouth of this hernia.                              Assessment/Plan:     * Diverticulitis of colon with perforation    - General surgery primary -- management per primary team  - Received one time dose of Zosyn 4.5 gm IV today  - May require surgery if condition worsens or fails to improve  - Analgesics prn          Pelvic abscess in female    - IR consulted by General surgery  - 8.3 Italian drainage catheter placed and approx 45 ccs of purulent fluid removed and sent for cultures -- which are currently pending               Hypokalemia    - K+ 2.6  - Repleted with KCL 60 mEq PO x 1 and received  fluid bolus of D51/2NS with KCL 40 mEq IV x 1  - Telemetry monitoring  - Repeat BMP now and will obtain Mg level as well          Essential hypertension    - Due to borderline hypotension, will hold Benicar-HCT at this time  - Will continue to monitor BP trends and restart medication once BP allows            VTE Risk Mitigation (From admission, onward)        Ordered     enoxaparin injection 40 mg  Daily      09/07/18 1228     IP VTE HIGH RISK PATIENT  Once      09/07/18 1228              Thank you for your consult. I will follow-up with patient. Please contact us if you have any additional questions.    Grecia Bazzi, LYNDON  Department of Hospital Medicine   [facility]

## 2018-09-07 NOTE — HPI
Lulú Seth is a 37 year old female admitted by General surgery for perforated diverticulitis with intra abdominal abscess. IR was consulted by General surgery today for drainage of abscess -- 8.3 Maltese drainage catheter placed and approx 45 ccs of purulent fluid removed and sent to lab. She may require surgery if condition worsens or fails to improve with IVF and IV antibiotics. HM consulted for medical management of HTN. Pt currently takes Benicar-HCT 40-25 mg PO daily for HTN. Will hold antihypertensives for now due to borderline hypotension. Will continue to monitor and restart medication once BP allows.

## 2018-09-07 NOTE — ASSESSMENT & PLAN NOTE
- General surgery primary -- management per primary team  - Received one time dose of Zosyn 4.5 gm IV today  - May require surgery if condition worsens or fails to improve  - Analgesics prn

## 2018-09-07 NOTE — PLAN OF CARE
SW met with patient in the ED. Patient lives with her children (adult & minor).  Patient is independent with ADL's at home.  Patient voiced no d/c concerns.  No anticipated needs at present. Case mgt to follow up with d/c needs.     09/07/18 1516   Discharge Assessment   Assessment Type Discharge Planning Assessment   Confirmed/corrected address and phone number on facesheet? Yes   Assessment information obtained from? Patient   Communicated expected length of stay with patient/caregiver no   Current cognitive status: Alert/Oriented   Current Functional Status: Independent   Lives With child(dell), adult;child(dell), dependent   Able to Return to Prior Arrangements yes   Is patient able to care for self after discharge? Yes   Who are your caregiver(s) and their phone number(s)? jacki tolbert  (mother) ([phone])   Patient's perception of discharge disposition home or selfcare   Readmission Within The Last 30 Days no previous admission in last 30 days   Patient currently being followed by outpatient case management? No   Patient currently receives any other outside agency services? No   Equipment Currently Used at Home none   Do you have any problems affording any of your prescribed medications? No   Is the patient taking medications as prescribed? yes   Does the patient have transportation home? Yes   Transportation Available family or friend will provide   Discharge Plan A Home

## 2018-09-07 NOTE — INTERVAL H&P NOTE
The patient has been examined and the H&P has been reviewed:    I concur with the findings and no changes have occurred since H&P was written.        Active Hospital Problems    Diagnosis  POA    *Pelvic abscess in female [N73.9]  Yes    Diverticulitis of colon with perforation [K57.20]  Yes    Essential hypertension [I10]  Yes      Resolved Hospital Problems    Diagnosis Date Resolved POA    Peritoneal abscess [K65.1] 09/07/2018 Unknown

## 2018-09-07 NOTE — SUBJECTIVE & OBJECTIVE
No current facility-administered medications on file prior to encounter.      Current Outpatient Medications on File Prior to Encounter   Medication Sig    docusate sodium (COLACE) 100 MG capsule Take 1 capsule (100 mg total) by mouth 2 (two) times daily.    olmesartan-hydrochlorothiazide (BENICAR HCT) 40-25 mg per tablet Take 1 tablet by mouth once daily.    polyethylene glycol (GLYCOLAX) 17 gram/dose powder Take 17 g by mouth once daily.       Review of patient's allergies indicates:  No Known Allergies    Past Medical History:   Diagnosis Date    Hypertension      Past Surgical History:   Procedure Laterality Date     SECTION, CLASSIC      x1    DILATION AND CURETTAGE OF UTERUS       Family History     Problem Relation (Age of Onset)    Diabetes     Hypertension         Tobacco Use    Smoking status: Never Smoker    Smokeless tobacco: Never Used   Substance and Sexual Activity    Alcohol use: Yes     Comment: occasion     Drug use: No    Sexual activity: Yes     Partners: Male     Review of Systems   Constitutional: Negative for chills, fatigue, fever and unexpected weight change.   Respiratory: Negative for cough, shortness of breath, wheezing and stridor.    Cardiovascular: Negative for chest pain, palpitations and leg swelling.   Gastrointestinal: Negative for abdominal distention, abdominal pain, constipation, diarrhea, nausea and vomiting.   Genitourinary: Negative for difficulty urinating, dysuria, frequency, hematuria and urgency.   Skin: Negative for color change, pallor, rash and wound.   Hematological: Does not bruise/bleed easily.     Objective:     Vital Signs (Most Recent):  Temp: 98.3 °F (36.8 °C) (18 0845)  Pulse: 70 (18 1208)  Resp: 18 (18 1208)  BP: (!) 116/56 (18 1208)  SpO2: 99 % (18 1208) Vital Signs (24h Range):  Temp:  [98.3 °F (36.8 °C)] 98.3 °F (36.8 °C)  Pulse:  [70-94] 70  Resp:  [18-20] 18  SpO2:  [96 %-99 %] 99 %  BP: (116-138)/(56-67)  116/56     Weight: 109.5 kg (241 lb 6.5 oz)  Body mass index is 41.44 kg/m².    Physical Exam   Constitutional: She is oriented to person, place, and time. She appears well-developed and well-nourished. No distress.   HENT:   Head: Normocephalic and atraumatic.   Eyes: EOM are normal.   Neck: Neck supple.   Cardiovascular: Normal rate and regular rhythm.   Pulmonary/Chest: Effort normal and breath sounds normal.   Abdominal: Soft. Bowel sounds are normal. She exhibits no distension. There is tenderness (Mild lower abdominal).   Neurological: She is alert and oriented to person, place, and time.   Skin: Skin is warm and dry.   Vitals reviewed.      Significant Labs:  CBC:   Recent Labs   Lab  09/07/18   0859   WBC  17.55*   RBC  3.39*   HGB  8.3*   HCT  25.9*   PLT  534*   MCV  76*   MCH  24.5*   MCHC  32.0     CMP:   Recent Labs   Lab  09/07/18   0859   GLU  117*   CALCIUM  8.9   ALBUMIN  2.3*   PROT  8.0   NA  138   K  2.6*   CO2  27   CL  97   BUN  17   CREATININE  1.1   ALKPHOS  169*   ALT  30   AST  19   BILITOT  0.3       Significant Diagnostics:  CT:  1. There is a poorly visualized fluid collection between the uterus and the rectum. This fluid collection measures approximately 4 cm in craniocaudal dimension. There is no gas visualized within this fluid collection. In the mesenteric portion of the inferior aspect of the abdomen and in the pelvis  there are several fluid and gas collections that have moderate adjacent inflammatory changes. The larger 1 in this area measures 4 cm.  These findings are worrisome for abscesses.  The examination is limited secondary to the lack of oral and IV contrast.  2. There is a mild amount of alveolar consolidation scattered throughout the left lower lobe.  This is characteristic of pneumonia.  3. There is a 3 mm nonobstructive stone in the inferior pole of the left kidney.  4. The liver is enlarged. It measures 22.2 cm in craniocaudal dimension.  5. There is a 3 mm  nonobstructive stone in the inferior pole of the left kidney.  6. The uterus and ovaries were not well seen.  7. There is a moderate size umbilical hernia. The width of the mouth of this hernia measures 51 mm. A segment of the transverse portion of the colon is in the region of the mouth of this hernia.

## 2018-09-07 NOTE — SEDATION DOCUMENTATION
Procedure complete. Pt tolerated well, vss. 45ml purulent fluid removed from left posterior pelvis. Samples sent to lab for testing. 8.3F LAURA drain left in place and secured with sutures, stat lock, gauze and tegaderm. Pt transferred self to stretcher and transported back to ED.

## 2018-09-07 NOTE — PT/OT/SLP PROGRESS
Physical Therapy      Patient Name:  Lulú Seth   MRN:  [id number]    SAMMITRamón DRISCOLL INITIATED THIS PM VIA CHART REVIEW, ATTEMPTED P.T. EVAL IN E.R. BUT PT CURRENTLY IN PROCEDURE, CT GUIDED IR, WILL COMPLETE P.T. EVAL ONCE PT AVAILABLE NEXT VISIT    Lisa Albright, PT   9/7/2018  5644

## 2018-09-07 NOTE — ED PROVIDER NOTES
Encounter Date: 2018       History     Chief Complaint   Patient presents with    Constipation     pt states she was seen here for constipation last week and has not had a BM since then; has taken OTC meds with no relief     37 year old male with complaint of lower abdominal pain and constipation X one week.  No vomiting.  No diarrhea.  Reports pain worse with movement and walking.  No fever or chills.  No pain with urination.           Review of patient's allergies indicates:  No Known Allergies  Past Medical History:   Diagnosis Date    Hypertension      Past Surgical History:   Procedure Laterality Date     SECTION, CLASSIC      x1    DILATION AND CURETTAGE OF UTERUS       Family History   Problem Relation Age of Onset    Hypertension Unknown     Diabetes Unknown      Social History     Tobacco Use    Smoking status: Never Smoker    Smokeless tobacco: Never Used   Substance Use Topics    Alcohol use: Yes     Comment: occasion     Drug use: No     Review of Systems   Constitutional: Negative for fever.   HENT: Negative for sore throat.    Respiratory: Negative for shortness of breath.    Cardiovascular: Negative for chest pain.   Gastrointestinal: Positive for abdominal pain. Negative for nausea.   Genitourinary: Negative for dysuria.   Musculoskeletal: Negative for back pain.   Skin: Negative for rash.   Neurological: Negative for weakness.   Hematological: Does not bruise/bleed easily.       Physical Exam     Initial Vitals [18 0845]   BP Pulse Resp Temp SpO2   138/67 94 20 98.3 °F (36.8 °C) 96 %      MAP       --         Physical Exam    Nursing note and vitals reviewed.  Constitutional: She appears well-developed and well-nourished.   HENT:   Head: Normocephalic and atraumatic.   Eyes: Conjunctivae and EOM are normal. Pupils are equal, round, and reactive to light.   Neck: Normal range of motion. Neck supple.   Cardiovascular: Normal rate, regular rhythm, normal heart sounds and  intact distal pulses.   Pulmonary/Chest: Breath sounds normal.   Abdominal: Soft. There is no rebound and no guarding.   Mild left lower abdominal tenderness, no guarding or rebound   Musculoskeletal: Normal range of motion.   Neurological: She is alert and oriented to person, place, and time. She has normal strength and normal reflexes.   Skin: Skin is warm and dry.   Psychiatric: She has a normal mood and affect. Her behavior is normal. Thought content normal.         ED Course   Procedures  Labs Reviewed   CBC W/ AUTO DIFFERENTIAL - Abnormal; Notable for the following components:       Result Value    WBC 17.55 (*)     RBC 3.39 (*)     Hemoglobin 8.3 (*)     Hematocrit 25.9 (*)     MCV 76 (*)     MCH 24.5 (*)     RDW 17.1 (*)     Platelets 534 (*)     MPV 8.9 (*)     Gran # (ANC) 13.9 (*)     Mono # 1.7 (*)     Gran% 79.7 (*)     Lymph% 10.5 (*)     All other components within normal limits   COMPREHENSIVE METABOLIC PANEL - Abnormal; Notable for the following components:    Potassium 2.6 (*)     Glucose 117 (*)     Albumin 2.3 (*)     Alkaline Phosphatase 169 (*)     All other components within normal limits    Narrative:     K   critical result(s) called and verbal readback obtained from Kade Shah LPN, 09/07/2018 10:09   LIPASE          Imaging Results          CT Renal Stone Study Abd Pelvis WO (Final result)  Result time 09/07/18 10:51:06    Final result by DIMPLE Greer Sr., MD (09/07/18 10:51:06)                 Impression:      1. There is a poorly visualized fluid collection between the uterus and the rectum. This fluid collection measures approximately 4 cm in craniocaudal dimension. There is no gas visualized within this fluid collection. In the mesenteric portion of the inferior aspect of the abdomen and in the pelvis  there are several fluid and gas collections that have moderate adjacent inflammatory changes. The larger 1 in this area measures 4 cm.  These findings are worrisome for abscesses.   The examination is limited secondary to the lack of oral and IV contrast.  2. There is a mild amount of alveolar consolidation scattered throughout the left lower lobe.  This is characteristic of pneumonia.  3. There is a 3 mm nonobstructive stone in the inferior pole of the left kidney.  4. The liver is enlarged. It measures 22.2 cm in craniocaudal dimension.  5. There is a 3 mm nonobstructive stone in the inferior pole of the left kidney.  6. The uterus and ovaries were not well seen.  7. There is a moderate size umbilical hernia. The width of the mouth of this hernia measures 51 mm. A segment of the transverse portion of the colon is in the region of the mouth of this hernia.  8. The above findings and impressions were discussed with Dr. Costello via the telephone at 10:40 on 09/07/2018.  All CT scans at this facility use dose modulation, iterative reconstruction, and/or weight base dosing when appropriate to reduce radiation dose when appropriate to reduce radiation dose to as low as reasonably achievable.      Electronically signed by: Raghavendra Greer MD  Date:    09/07/2018  Time:    10:51             Narrative:    EXAMINATION:  CT RENAL STONE STUDY ABD PELVIS WO    CLINICAL HISTORY:  Abdominal pain, unspecified;    TECHNIQUE:  Standard abdomen and pelvis CT protocol without oral or IV contrast was performed.    FINDINGS:  Finding: The size of the heart is within normal limits.  There is a mild amount of alveolar consolidation scattered throughout the left lower lobe.  The visualized portion of the right lung is clear.  There is no pneumothorax or pleural effusion.    The liver is enlarged.  It measures 22.2 cm in craniocaudal dimension.  The gallbladder, pancreas, spleen, adrenals, and right kidney are normal in appearance.  There is a 3 mm nonobstructive stone in the inferior pole of the left kidney.  The ureters and the urinary bladder are normal in appearance. The appendix is not visualized.  There is a  poorly visualized fluid collection between the uterus and the rectum.  This fluid collection measures approximately 4 cm in craniocaudal dimension.  There is no gas visualized within this fluid collection.  In the mesenteric portion of the inferior aspect of the abdomen and in the pelvis there are several fluid and gas collections that have moderate adjacent inflammatory changes.  The larger 1 in this area measures 4 cm.  There is no free fluid within the abdomen or pelvis. There is no pneumoperitoneum.  The uterus and ovaries were not well seen.  There is a moderate size umbilical hernia.  The width of the mouth of this hernia measures 51 mm.  A segment of the transverse portion of the colon is in the region of the mouth of this hernia.                                 Medical Decision Makin:12 AM  Case discussed with Dr. Romeo.  Dr. Romeo will come to evaluate pt.     12:22 PM  Dr. Romeo will admit pt                      Clinical Impression:   The primary encounter diagnosis was Lower abdominal pain. Diagnoses of Intra-abdominal abscess and Hypokalemia were also pertinent to this visit.                             Tavo Costello NP  18 1222       Tavo Costello NP  18 1223

## 2018-09-07 NOTE — ASSESSMENT & PLAN NOTE
- K+ 2.6  - Repleted with KCL 60 mEq PO x 1 and received fluid bolus of D51/2NS with KCL 40 mEq IV x 1  - Telemetry monitoring  - Repeat BMP now and will obtain Mg level as well

## 2018-09-07 NOTE — ASSESSMENT & PLAN NOTE
- IR consulted by General surgery  - 8.3 Occitan drainage catheter placed and approx 45 ccs of purulent fluid removed and sent for cultures -- which are currently pending

## 2018-09-07 NOTE — H&P
[facility] -   General Surgery  History & Physical    Patient Name: Lulú Seth  MRN: [id number]  Admission Date: 2018  Attending Physician:  Daniel Romeo MD  Primary Care Provider: Provider [employer]    Patient information was obtained from patient.     Subjective:     Chief Complaint/Reason for Admission:  Perforated diverticulitis with pelvic abscess    History of Present Illness: 37-year-old female referred for pelvic abscess.  The patient reports lower pelvic abdominal pain for a week.  She endorses intermittent constipation with passing some liquid bowel movements.  She denies any fevers, chills, urinary difficulties.    No current facility-administered medications on file prior to encounter.      Current Outpatient Medications on File Prior to Encounter   Medication Sig    docusate sodium (COLACE) 100 MG capsule Take 1 capsule (100 mg total) by mouth 2 (two) times daily.    olmesartan-hydrochlorothiazide (BENICAR HCT) 40-25 mg per tablet Take 1 tablet by mouth once daily.    polyethylene glycol (GLYCOLAX) 17 gram/dose powder Take 17 g by mouth once daily.       Review of patient's allergies indicates:  No Known Allergies    Past Medical History:   Diagnosis Date    Hypertension      Past Surgical History:   Procedure Laterality Date     SECTION, CLASSIC      x1    DILATION AND CURETTAGE OF UTERUS       Family History     Problem Relation (Age of Onset)    Diabetes     Hypertension         Tobacco Use    Smoking status: Never Smoker    Smokeless tobacco: Never Used   Substance and Sexual Activity    Alcohol use: Yes     Comment: occasion     Drug use: No    Sexual activity: Yes     Partners: Male     Review of Systems   Constitutional: Negative for chills, fatigue, fever and unexpected weight change.   Respiratory: Negative for cough, shortness of breath, wheezing and stridor.    Cardiovascular: Negative for chest pain, palpitations and leg swelling.    Gastrointestinal: Negative for abdominal distention, abdominal pain, constipation, diarrhea, nausea and vomiting.   Genitourinary: Negative for difficulty urinating, dysuria, frequency, hematuria and urgency.   Skin: Negative for color change, pallor, rash and wound.   Hematological: Does not bruise/bleed easily.     Objective:     Vital Signs (Most Recent):  Temp: 98.3 °F (36.8 °C) (09/07/18 0845)  Pulse: 70 (09/07/18 1208)  Resp: 18 (09/07/18 1208)  BP: (!) 116/56 (09/07/18 1208)  SpO2: 99 % (09/07/18 1208) Vital Signs (24h Range):  Temp:  [98.3 °F (36.8 °C)] 98.3 °F (36.8 °C)  Pulse:  [70-94] 70  Resp:  [18-20] 18  SpO2:  [96 %-99 %] 99 %  BP: (116-138)/(56-67) 116/56     Weight: 109.5 kg (241 lb 6.5 oz)  Body mass index is 41.44 kg/m².    Physical Exam   Constitutional: She is oriented to person, place, and time. She appears well-developed and well-nourished. No distress.   HENT:   Head: Normocephalic and atraumatic.   Eyes: EOM are normal.   Neck: Neck supple.   Cardiovascular: Normal rate and regular rhythm.   Pulmonary/Chest: Effort normal and breath sounds normal.   Abdominal: Soft. Bowel sounds are normal. She exhibits no distension. There is tenderness (Mild lower abdominal).   Neurological: She is alert and oriented to person, place, and time.   Skin: Skin is warm and dry.   Vitals reviewed.      Significant Labs:  CBC:   Recent Labs   Lab  09/07/18   0859   WBC  17.55*   RBC  3.39*   HGB  8.3*   HCT  25.9*   PLT  534*   MCV  76*   MCH  24.5*   MCHC  32.0     CMP:   Recent Labs   Lab  09/07/18   0859   GLU  117*   CALCIUM  8.9   ALBUMIN  2.3*   PROT  8.0   NA  138   K  2.6*   CO2  27   CL  97   BUN  17   CREATININE  1.1   ALKPHOS  169*   ALT  30   AST  19   BILITOT  0.3       Significant Diagnostics:  CT:  1. There is a poorly visualized fluid collection between the uterus and the rectum. This fluid collection measures approximately 4 cm in craniocaudal dimension. There is no gas visualized within this  fluid collection. In the mesenteric portion of the inferior aspect of the abdomen and in the pelvis  there are several fluid and gas collections that have moderate adjacent inflammatory changes. The larger 1 in this area measures 4 cm.  These findings are worrisome for abscesses.  The examination is limited secondary to the lack of oral and IV contrast.  2. There is a mild amount of alveolar consolidation scattered throughout the left lower lobe.  This is characteristic of pneumonia.  3. There is a 3 mm nonobstructive stone in the inferior pole of the left kidney.  4. The liver is enlarged. It measures 22.2 cm in craniocaudal dimension.  5. There is a 3 mm nonobstructive stone in the inferior pole of the left kidney.  6. The uterus and ovaries were not well seen.  7. There is a moderate size umbilical hernia. The width of the mouth of this hernia measures 51 mm. A segment of the transverse portion of the colon is in the region of the mouth of this hernia.    Assessment/Plan:     * Diverticulitis of colon with perforation    Admit for perforated diverticulitis with intra-abdominal abscess less likely perforated appendicitis  NPO, IV fluids, IV antibiotics  IR drainage of pelvic abscess  Discussed with patient that if condition worsens or fails to improve may require surgery. Risks and benefits of observation as well as surgery discussed with patient. Patient agrees with plan.        Essential hypertension    IV hydralazine p.r.n.  Will restart home medications once tolerating p.o.        Pelvic abscess in female    IR drainage          VTE Risk Mitigation (From admission, onward)        Ordered     enoxaparin injection 40 mg  Daily      09/07/18 1228     IP VTE HIGH RISK PATIENT  Once      09/07/18 1228          Daniel Romeo MD  General Surgery  [facility]

## 2018-09-07 NOTE — PLAN OF CARE
Problem: Patient Care Overview  Goal: Plan of Care Review  Outcome: Ongoing (interventions implemented as appropriate)  Received patient from ED, tolerating well at this time. Complains of pain around abdomen. Drain clean, dry, and intact. Purulent malodorous drainage. Emptied as needed. Will continue to monitor. 12 hour chart check.

## 2018-09-08 PROBLEM — J18.9 PNEUMONIA DUE TO INFECTIOUS ORGANISM: Status: ACTIVE | Noted: 2018-09-08

## 2018-09-08 LAB
ANION GAP SERPL CALC-SCNC: 9 MMOL/L
BASOPHILS # BLD AUTO: 0.01 K/UL
BASOPHILS NFR BLD: 0.1 %
BUN SERPL-MCNC: 12 MG/DL
CALCIUM SERPL-MCNC: 8.8 MG/DL
CHLORIDE SERPL-SCNC: 101 MMOL/L
CO2 SERPL-SCNC: 32 MMOL/L
CREAT SERPL-MCNC: 0.9 MG/DL
DIFFERENTIAL METHOD: ABNORMAL
EOSINOPHIL # BLD AUTO: 0.1 K/UL
EOSINOPHIL NFR BLD: 1 %
ERYTHROCYTE [DISTWIDTH] IN BLOOD BY AUTOMATED COUNT: 17.3 %
EST. GFR  (AFRICAN AMERICAN): >60 ML/MIN/1.73 M^2
EST. GFR  (NON AFRICAN AMERICAN): >60 ML/MIN/1.73 M^2
GLUCOSE SERPL-MCNC: 97 MG/DL
HCT VFR BLD AUTO: 26.7 %
HGB BLD-MCNC: 8.3 G/DL
LYMPHOCYTES # BLD AUTO: 1.4 K/UL
LYMPHOCYTES NFR BLD: 9.9 %
MAGNESIUM SERPL-MCNC: 2.5 MG/DL
MCH RBC QN AUTO: 24.4 PG
MCHC RBC AUTO-ENTMCNC: 31.1 G/DL
MCV RBC AUTO: 79 FL
MONOCYTES # BLD AUTO: 1 K/UL
MONOCYTES NFR BLD: 6.6 %
NEUTROPHILS # BLD AUTO: 11.9 K/UL
NEUTROPHILS NFR BLD: 82.8 %
PLATELET # BLD AUTO: 538 K/UL
PMV BLD AUTO: 8.9 FL
POCT GLUCOSE: 109 MG/DL (ref 70–110)
POCT GLUCOSE: 181 MG/DL (ref 70–110)
POTASSIUM SERPL-SCNC: 3.2 MMOL/L
RBC # BLD AUTO: 3.4 M/UL
SODIUM SERPL-SCNC: 142 MMOL/L
WBC # BLD AUTO: 14.46 K/UL

## 2018-09-08 PROCEDURE — 80048 BASIC METABOLIC PNL TOTAL CA: CPT

## 2018-09-08 PROCEDURE — 36415 COLL VENOUS BLD VENIPUNCTURE: CPT

## 2018-09-08 PROCEDURE — 94799 UNLISTED PULMONARY SVC/PX: CPT

## 2018-09-08 PROCEDURE — 25000003 PHARM REV CODE 250: Performed by: NURSE PRACTITIONER

## 2018-09-08 PROCEDURE — 11000001 HC ACUTE MED/SURG PRIVATE ROOM

## 2018-09-08 PROCEDURE — 87040 BLOOD CULTURE FOR BACTERIA: CPT | Mod: 59

## 2018-09-08 PROCEDURE — 25000003 PHARM REV CODE 250: Performed by: SURGERY

## 2018-09-08 PROCEDURE — 96372 THER/PROPH/DIAG INJ SC/IM: CPT

## 2018-09-08 PROCEDURE — 99231 SBSQ HOSP IP/OBS SF/LOW 25: CPT | Mod: ,,, | Performed by: SURGERY

## 2018-09-08 PROCEDURE — 99900031 HC PATIENT EDUCATION (STAT)

## 2018-09-08 PROCEDURE — 85025 COMPLETE CBC W/AUTO DIFF WBC: CPT

## 2018-09-08 PROCEDURE — 63600175 PHARM REV CODE 636 W HCPCS: Performed by: SURGERY

## 2018-09-08 PROCEDURE — 83735 ASSAY OF MAGNESIUM: CPT

## 2018-09-08 RX ORDER — DEXTROSE MONOHYDRATE, SODIUM CHLORIDE, AND POTASSIUM CHLORIDE 50; 1.49; 4.5 G/1000ML; G/1000ML; G/1000ML
INJECTION, SOLUTION INTRAVENOUS CONTINUOUS
Status: DISCONTINUED | OUTPATIENT
Start: 2018-09-08 | End: 2018-09-10 | Stop reason: HOSPADM

## 2018-09-08 RX ORDER — POTASSIUM CHLORIDE 750 MG/1
30 TABLET, EXTENDED RELEASE ORAL ONCE
Status: COMPLETED | OUTPATIENT
Start: 2018-09-08 | End: 2018-09-08

## 2018-09-08 RX ORDER — POLYETHYLENE GLYCOL 3350 17 G/17G
17 POWDER, FOR SOLUTION ORAL DAILY
Status: DISCONTINUED | OUTPATIENT
Start: 2018-09-08 | End: 2018-09-10 | Stop reason: HOSPADM

## 2018-09-08 RX ORDER — IPRATROPIUM BROMIDE AND ALBUTEROL SULFATE 2.5; .5 MG/3ML; MG/3ML
3 SOLUTION RESPIRATORY (INHALATION) EVERY 4 HOURS PRN
Status: DISCONTINUED | OUTPATIENT
Start: 2018-09-08 | End: 2018-09-10 | Stop reason: HOSPADM

## 2018-09-08 RX ADMIN — POTASSIUM CHLORIDE 30 MEQ: 750 TABLET, EXTENDED RELEASE ORAL at 08:09

## 2018-09-08 RX ADMIN — PIPERACILLIN AND TAZOBACTAM 4.5 G: 4; .5 INJECTION, POWDER, LYOPHILIZED, FOR SOLUTION INTRAVENOUS; PARENTERAL at 04:09

## 2018-09-08 RX ADMIN — PIPERACILLIN AND TAZOBACTAM 4.5 G: 4; .5 INJECTION, POWDER, LYOPHILIZED, FOR SOLUTION INTRAVENOUS; PARENTERAL at 08:09

## 2018-09-08 RX ADMIN — RAMELTEON 8 MG: 8 TABLET, FILM COATED ORAL at 08:09

## 2018-09-08 RX ADMIN — HYDROCODONE BITARTRATE AND ACETAMINOPHEN 2 TABLET: 5; 325 TABLET ORAL at 08:09

## 2018-09-08 RX ADMIN — ENOXAPARIN SODIUM 40 MG: 40 INJECTION SUBCUTANEOUS at 04:09

## 2018-09-08 RX ADMIN — POLYETHYLENE GLYCOL 3350 17 G: 17 POWDER, FOR SOLUTION ORAL at 09:09

## 2018-09-08 RX ADMIN — DEXTROSE MONOHYDRATE, SODIUM CHLORIDE, AND POTASSIUM CHLORIDE: 50; 4.5; 1.49 INJECTION, SOLUTION INTRAVENOUS at 04:09

## 2018-09-08 RX ADMIN — MORPHINE SULFATE 2 MG: 4 INJECTION INTRAVENOUS at 01:09

## 2018-09-08 RX ADMIN — DEXTROSE MONOHYDRATE, SODIUM CHLORIDE, AND POTASSIUM CHLORIDE: 50; 4.5; 1.49 INJECTION, SOLUTION INTRAVENOUS at 09:09

## 2018-09-08 RX ADMIN — SODIUM CHLORIDE, SODIUM LACTATE, POTASSIUM CHLORIDE, AND CALCIUM CHLORIDE: .6; .31; .03; .02 INJECTION, SOLUTION INTRAVENOUS at 08:09

## 2018-09-08 NOTE — ASSESSMENT & PLAN NOTE
Clear liquids, IV fluids, IV antibiotics  LAURA drain with purulent output  Abdominal pain slightly improved  Will transition to oral medications once bowel function returning

## 2018-09-08 NOTE — ASSESSMENT & PLAN NOTE
- K+ 3.2  - Mg level unremarkable  - Repleted with KCL 30 mEq PO x 1   - Telemetry monitoring  - BMP in AM

## 2018-09-08 NOTE — CONSULTS
Discharge planning assessment completed with patient's assistance.  Patient from home and independent.  Patient has two children, 20 yr and 4 yr old.  Patient had no DME, OP HD, HH, or inpatient hospital stay within the last 30 days.  Patient's PCP is Dr. Elijah Renee.  Patient's prefered pharmacy is [facility].  Discharge disposition is to home.  No needs identified.  Patient provided with a healthcare packet and contents explained.       Yamini De Guzman LMSW, TIM-PERCY, CCM  9/8/2018

## 2018-09-08 NOTE — SUBJECTIVE & OBJECTIVE
Interval History: Pt seen and examined. Pt has no complaints at this time.     Review of Systems   Constitutional: Positive for activity change. Negative for chills and fever.   HENT: Negative.    Eyes: Negative.    Respiratory: Negative for apnea, cough, choking, chest tightness, shortness of breath, wheezing and stridor.    Cardiovascular: Negative for chest pain, palpitations and leg swelling.   Gastrointestinal: Negative for abdominal pain, constipation, diarrhea, nausea and vomiting.   Endocrine: Negative.    Genitourinary: Negative.    Musculoskeletal: Negative for arthralgias, back pain, gait problem, myalgias, neck pain and neck stiffness.   Skin: Negative.    Allergic/Immunologic: Negative.    Neurological: Negative for dizziness, tremors, seizures, syncope, facial asymmetry, speech difficulty, weakness, light-headedness, numbness and headaches.   Hematological: Negative.    Psychiatric/Behavioral: Negative.      Objective:     Vital Signs (Most Recent):  Temp: 98.6 °F (37 °C) (09/08/18 1135)  Pulse: 89 (09/08/18 1135)  Resp: 18 (09/08/18 1135)  BP: (!) 118/59 (09/08/18 1135)  SpO2: 99 % (09/08/18 1135) Vital Signs (24h Range):  Temp:  [98.4 °F (36.9 °C)-100.4 °F (38 °C)] 98.6 °F (37 °C)  Pulse:  [76-91] 89  Resp:  [11-24] 18  SpO2:  [94 %-100 %] 99 %  BP: (103-136)/(52-65) 118/59     Weight: 118 kg (260 lb 2.6 oz)  Body mass index is 44.66 kg/m².    Intake/Output Summary (Last 24 hours) at 9/8/2018 1227  Last data filed at 9/8/2018 0916  Gross per 24 hour   Intake 2727.91 ml   Output 30 ml   Net 2697.91 ml      Physical Exam   Constitutional: She is oriented to person, place, and time. She appears well-developed and well-nourished. She is cooperative. She is easily aroused. No distress.   HENT:   Head: Normocephalic and atraumatic.   Eyes: EOM are normal.   Neck: Normal range of motion. Neck supple.   Cardiovascular: Normal rate, regular rhythm, normal heart sounds and intact distal pulses.   No murmur  heard.  Pulmonary/Chest: Effort normal. No stridor. No respiratory distress. She has wheezes (mild expiratory wheezing noted) in the right middle field, the right lower field and the left lower field. She has no rales. She exhibits no tenderness.   Loose cough noted   Abdominal: Soft. Bowel sounds are normal. She exhibits no distension. There is no tenderness. There is no rebound and no guarding.   Genitourinary:   Genitourinary Comments: Deferred   Musculoskeletal: She exhibits no edema, tenderness or deformity.   Neurological: She is alert, oriented to person, place, and time and easily aroused. No sensory deficit.   Skin: Skin is warm and dry. Capillary refill takes less than 2 seconds.        Psychiatric: She has a normal mood and affect. Her behavior is normal. Judgment and thought content normal.   Nursing note and vitals reviewed.      Significant Labs:   BMP:   Recent Labs   Lab  09/08/18   0528   GLU  97   NA  142   K  3.2*   CL  101   CO2  32*   BUN  12   CREATININE  0.9   CALCIUM  8.8   MG  2.5     CBC:   Recent Labs   Lab  09/07/18   0859  09/08/18   0528   WBC  17.55*  14.46*   HGB  8.3*  8.3*   HCT  25.9*  26.7*   PLT  534*  538*     Magnesium:   Recent Labs   Lab  09/07/18   1731  09/08/18   0528   MG  2.7*  2.5     POCT Glucose:   Recent Labs   Lab  09/07/18   1310  09/08/18   1054   POCTGLUCOSE  117*  109       Significant Imaging:   Imaging Results          CT Guided Abscess Drainage With Catheter (Final result)  Result time 09/07/18 16:23:47    Final result by Andres Menchaca MD (09/07/18 16:23:47)                 Impression:      CT guided abscess strain as above.  Follow-up cultures    All CT scans at this facility use dose modulation, iterative reconstruction, and/or weight based dosing when appropriate to reduce radiation dose to as low as reasonable achievable.      Electronically signed by: Andres Menchaca MD  Date:    09/07/2018  Time:    16:23             Narrative:    EXAMINATION:  CT  GUIDED ABSCESS DRAINAGE WITH CATHETER    CLINICAL HISTORY:  Intra abdominal abscess;    TECHNIQUE:  Medications:    Moderate sedation using versed and fentanyl was provided with and trained observer monitoring the patient's vital signs and level of consciousness. The total time of sedation was 30 min.    1% lidocaine locally    :YOVANI Menchaca    Complications: None    COMPARISON:  09/07/2018    FINDINGS:  Procedure: The risks and benefits of this procedure were discussed with the patient, written informed consent was obtained.  The patient was placed supine on the CT gantry.  Preprocedural imaging revealed cul de sac fluid collection.  A suitable skin site for drainage was selected.  IV fentanyl and Versed was given for conscious sedation.  The skin site was prepped and draped in sterile fashion.  The skin was anesthetized with 1% lidocaine.    Using CT guidance a 19-gauge introducer needle was guided into the collection via a posterior approach with the patient in prone position.  Prior to wire placement appropriate needle positioning was confirmed with CT.   Wire was advanced within the collection.  Images demonstrated appropriate positioning.  Tract was dilated to 8 Urdu and an 8.5 Urdu APD catheter was placed within the collection.  There is return of approximately 30 cc of clarissa pus.  Catheter was secured at the skin surface..    Postprocedural imaging was acquired. The site was bandaged sterilely. The patient left the room in stable condition.    Findings:    1.  Preprocedural CT showed cul de sac fluid collection with thick wall.    2.  CT-guided drainage    3.  Post procedural CT showed no complication and appropriate positioning of the drainage catheter.                               CT Renal Stone Study Abd Pelvis WO (Final result)  Result time 09/07/18 10:51:06    Final result by DIMPLE Greer Sr., MD (09/07/18 10:51:06)                 Impression:      1. There is a poorly visualized fluid  collection between the uterus and the rectum. This fluid collection measures approximately 4 cm in craniocaudal dimension. There is no gas visualized within this fluid collection. In the mesenteric portion of the inferior aspect of the abdomen and in the pelvis  there are several fluid and gas collections that have moderate adjacent inflammatory changes. The larger 1 in this area measures 4 cm.  These findings are worrisome for abscesses.  The examination is limited secondary to the lack of oral and IV contrast.  2. There is a mild amount of alveolar consolidation scattered throughout the left lower lobe.  This is characteristic of pneumonia.  3. There is a 3 mm nonobstructive stone in the inferior pole of the left kidney.  4. The liver is enlarged. It measures 22.2 cm in craniocaudal dimension.  5. There is a 3 mm nonobstructive stone in the inferior pole of the left kidney.  6. The uterus and ovaries were not well seen.  7. There is a moderate size umbilical hernia. The width of the mouth of this hernia measures 51 mm. A segment of the transverse portion of the colon is in the region of the mouth of this hernia.  8. The above findings and impressions were discussed with Dr. Costello via the telephone at 10:40 on 09/07/2018.  All CT scans at this facility use dose modulation, iterative reconstruction, and/or weight base dosing when appropriate to reduce radiation dose when appropriate to reduce radiation dose to as low as reasonably achievable.      Electronically signed by: Raghavendra Greer MD  Date:    09/07/2018  Time:    10:51             Narrative:    EXAMINATION:  CT RENAL STONE STUDY ABD PELVIS WO    CLINICAL HISTORY:  Abdominal pain, unspecified;    TECHNIQUE:  Standard abdomen and pelvis CT protocol without oral or IV contrast was performed.    FINDINGS:  Finding: The size of the heart is within normal limits.  There is a mild amount of alveolar consolidation scattered throughout the left lower lobe.  The  visualized portion of the right lung is clear.  There is no pneumothorax or pleural effusion.    The liver is enlarged.  It measures 22.2 cm in craniocaudal dimension.  The gallbladder, pancreas, spleen, adrenals, and right kidney are normal in appearance.  There is a 3 mm nonobstructive stone in the inferior pole of the left kidney.  The ureters and the urinary bladder are normal in appearance. The appendix is not visualized.  There is a poorly visualized fluid collection between the uterus and the rectum.  This fluid collection measures approximately 4 cm in craniocaudal dimension.  There is no gas visualized within this fluid collection.  In the mesenteric portion of the inferior aspect of the abdomen and in the pelvis there are several fluid and gas collections that have moderate adjacent inflammatory changes.  The larger 1 in this area measures 4 cm.  There is no free fluid within the abdomen or pelvis. There is no pneumoperitoneum.  The uterus and ovaries were not well seen.  There is a moderate size umbilical hernia.  The width of the mouth of this hernia measures 51 mm.  A segment of the transverse portion of the colon is in the region of the mouth of this hernia.

## 2018-09-08 NOTE — HOSPITAL COURSE
HD 1  pain slightly improved.  No bowel movement, IR drain placed yesterday with purulent drainage  Hospital day 2 no bowel movement, pain improved, tolerating clears

## 2018-09-08 NOTE — SUBJECTIVE & OBJECTIVE
Interval History: pain slightly improved.  No bowel movement, IR drain placed yesterday with purulent drainage    Medications:  Continuous Infusions:   dextrose 5 % and 0.45 % NaCl with KCl 20 mEq       Scheduled Meds:   enoxaparin  40 mg Subcutaneous Daily    lidocaine (PF) 10 mg/ml (1%)  1 mL Other Once    piperacillin-tazobactam 4.5 g in dextrose 5 % 100 mL IVPB (ready to mix system)  4.5 g Intravenous Q8H    polyethylene glycol  17 g Oral Daily     PRN Meds:acetaminophen, dextrose 50%, diphenhydrAMINE, glucagon (human recombinant), hydrALAZINE, HYDROcodone-acetaminophen, morphine, ondansetron, promethazine (PHENERGAN) IVPB, ramelteon     Review of patient's allergies indicates:  No Known Allergies  Objective:     Vital Signs (Most Recent):  Temp: 100 °F (37.8 °C) (09/08/18 0713)  Pulse: 89 (09/08/18 0713)  Resp: 17 (09/08/18 0713)  BP: (!) 108/56 (09/08/18 0713)  SpO2: 95 % (09/08/18 0713) Vital Signs (24h Range):  Temp:  [98.4 °F (36.9 °C)-100.4 °F (38 °C)] 100 °F (37.8 °C)  Pulse:  [70-91] 89  Resp:  [11-24] 17  SpO2:  [94 %-100 %] 95 %  BP: (103-136)/(52-65) 108/56     Weight: 118 kg (260 lb 2.6 oz)  Body mass index is 44.66 kg/m².    Intake/Output - Last 3 Shifts       09/06 0700 - 09/07 0659 09/07 0700 - 09/08 0659 09/08 0700 - 09/09 0659    P.O.   0    I.V. (mL/kg)  1601.1 (13.6)     Other  10     IV Piggyback  100 100    Total Intake(mL/kg)  1711.1 (14.5) 100 (0.8)    Urine (mL/kg/hr)  0     Drains  30     Total Output  30     Net  +1681.1 +100           Urine Occurrence  4 x 1 x          Physical Exam   Constitutional: She is oriented to person, place, and time. She appears well-nourished. No distress.   Cardiovascular: Normal rate and regular rhythm.   Murmur heard.  Pulmonary/Chest: Effort normal and breath sounds normal.   Abdominal: Soft. She exhibits no distension. There is no tenderness.   LAURA drain with purulent drainage   Neurological: She is alert and oriented to person, place, and time.    Vitals reviewed.      Significant Labs:  CBC:   Recent Labs   Lab  09/08/18 0528   WBC  14.46*   RBC  3.40*   HGB  8.3*   HCT  26.7*   PLT  538*   MCV  79*   MCH  24.4*   MCHC  31.1*     BMP:   Recent Labs   Lab  09/08/18   0528   GLU  97   NA  142   K  3.2*   CL  101   CO2  32*   BUN  12   CREATININE  0.9   CALCIUM  8.8   MG  2.5       Significant Diagnostics:  I have reviewed all pertinent imaging results/findings within the past 24 hours.

## 2018-09-08 NOTE — ASSESSMENT & PLAN NOTE
- Due to borderline hypotension, will continue to hold Benicar-HCT at this time  - Will monitor BP trends and restart medication once BP allows  - If remains hypotensive upon discharge, continue to hold BP medication and F/U with PCP

## 2018-09-08 NOTE — PROGRESS NOTES
[facility] -   General Surgery  Progress Note    Subjective:     History of Present Illness:  37-year-old female referred for pelvic abscess.  The patient reports lower pelvic abdominal pain for a week.  She endorses intermittent constipation with passing some liquid bowel movements.  She denies any fevers, chills, urinary difficulties.    Post-Op Info:  * No surgery found *         Interval History: pain slightly improved.  No bowel movement, IR drain placed yesterday with purulent drainage    Medications:  Continuous Infusions:   dextrose 5 % and 0.45 % NaCl with KCl 20 mEq       Scheduled Meds:   enoxaparin  40 mg Subcutaneous Daily    lidocaine (PF) 10 mg/ml (1%)  1 mL Other Once    piperacillin-tazobactam 4.5 g in dextrose 5 % 100 mL IVPB (ready to mix system)  4.5 g Intravenous Q8H    polyethylene glycol  17 g Oral Daily     PRN Meds:acetaminophen, dextrose 50%, diphenhydrAMINE, glucagon (human recombinant), hydrALAZINE, HYDROcodone-acetaminophen, morphine, ondansetron, promethazine (PHENERGAN) IVPB, ramelteon     Review of patient's allergies indicates:  No Known Allergies  Objective:     Vital Signs (Most Recent):  Temp: 100 °F (37.8 °C) (09/08/18 0713)  Pulse: 89 (09/08/18 0713)  Resp: 17 (09/08/18 0713)  BP: (!) 108/56 (09/08/18 0713)  SpO2: 95 % (09/08/18 0713) Vital Signs (24h Range):  Temp:  [98.4 °F (36.9 °C)-100.4 °F (38 °C)] 100 °F (37.8 °C)  Pulse:  [70-91] 89  Resp:  [11-24] 17  SpO2:  [94 %-100 %] 95 %  BP: (103-136)/(52-65) 108/56     Weight: 118 kg (260 lb 2.6 oz)  Body mass index is 44.66 kg/m².    Intake/Output - Last 3 Shifts       09/06 0700 - 09/07 0659 09/07 0700 - 09/08 0659 09/08 0700 - 09/09 0659    P.O.   0    I.V. (mL/kg)  1601.1 (13.6)     Other  10     IV Piggyback  100 100    Total Intake(mL/kg)  1711.1 (14.5) 100 (0.8)    Urine (mL/kg/hr)  0     Drains  30     Total Output  30     Net  +1681.1 +100           Urine Occurrence  4 x 1 x          Physical Exam    Constitutional: She is oriented to person, place, and time. She appears well-nourished. No distress.   Cardiovascular: Normal rate and regular rhythm.   Murmur heard.  Pulmonary/Chest: Effort normal and breath sounds normal.   Abdominal: Soft. She exhibits no distension. There is no tenderness.   LAURA drain with purulent drainage   Neurological: She is alert and oriented to person, place, and time.   Vitals reviewed.      Significant Labs:  CBC:   Recent Labs   Lab  09/08/18   0528   WBC  14.46*   RBC  3.40*   HGB  8.3*   HCT  26.7*   PLT  538*   MCV  79*   MCH  24.4*   MCHC  31.1*     BMP:   Recent Labs   Lab  09/08/18   0528   GLU  97   NA  142   K  3.2*   CL  101   CO2  32*   BUN  12   CREATININE  0.9   CALCIUM  8.8   MG  2.5       Significant Diagnostics:  I have reviewed all pertinent imaging results/findings within the past 24 hours.    Assessment/Plan:     * Diverticulitis of colon with perforation    Clear liquids, IV fluids, IV antibiotics  LAURA drain with purulent output  Abdominal pain slightly improved  Will transition to oral medications once bowel function returning        Hypokalemia    Replete potassium        Essential hypertension    IV hydralazine p.r.n.  Will restart home medications once tolerating p.o.        Pelvic abscess in female    LAURA drain with purulent drainage            Daniel Romeo MD  General Surgery  [facility] -

## 2018-09-08 NOTE — ASSESSMENT & PLAN NOTE
- General surgery primary -- management per primary team  - Continue IV Zosyn  - May require surgery if condition worsens or fails to improve  - Diet advanced to clear liquid   - Analgesics prn

## 2018-09-08 NOTE — HOSPITAL COURSE
Lulú Seth is a 37 year old female admitted by General surgery for perforated diverticulitis with intra abdominal abscess. IR was consulted by General surgery upon admit for drainage of abscess -- 8.3 Faroese drainage catheter placed and approx 45 ccs of purulent fluid removed. Cultures pending. If symptoms worsen or fail to improve, may require surgical intervention. HM consulted for medical management of HTN. Currently holding Benicar-HCT due to borderline hypotension. Will continue to monitor and restart once BP allows. If BP remains hypotensive upon discharge, will continue to hold BP medication and F/U with PCP for BP check. CT renal stone study showed alveolar consolidation scattered throughout the left lower lobe, characteristic of pneumonia. Pt reports having loose cough over a week with chills. Will obtain sputum culture with blood cultures x 2. IS, inhaled medications ordered and will continue IV Zosyn. As of 09/09 Max temp overnight 100.1. Leukocytosis trending down, currently 13.65K from 14.46K. Blood cultures with NGTD. Left buttock wound cultures (+) Streptococcus anginosis and gram negative poncho, lactose . Susceptibilities pending. May need to consult ID for antibiotic recommendations as streptococcus anginosis susceptibilities are not routinely done. Diet advanced to low fiber/residue. Will continue to hold Benicar-HCT due to continued borderline hypotension.

## 2018-09-08 NOTE — PROGRESS NOTES
[facility] Hospital Medicine  Progress Note    Patient Name: Lulú Seth  MRN: [id number]  Patient Class: IP- Inpatient   Admission Date: 9/7/2018  Length of Stay: 1 days  Attending Physician: Daniel Romeo MD  Primary Care Provider: Elijah Velarde MD        Subjective:     Principal Problem:Diverticulitis of colon with perforation    HPI:  Lulú Seth is a 37 year old female admitted by General surgery for perforated diverticulitis with intra abdominal abscess. IR was consulted by General surgery today for drainage of abscess -- 8.3 Thai drainage catheter placed and approx 45 ccs of purulent fluid removed and sent to lab. She may require surgery if condition worsens or fails to improve with IVF and IV antibiotics. HM consulted for medical management of HTN. Pt currently takes Benicar-HCT 40-25 mg PO daily for HTN. Will hold antihypertensives for now due to borderline hypotension. Will continue to monitor and restart medication once BP allows.    Hospital Course:  Lulú Seth is a 37 year old female admitted by General surgery for perforated diverticulitis with intra abdominal abscess. IR was consulted by General surgery upon admit for drainage of abscess -- 8.3 Thai drainage catheter placed and approx 45 ccs of purulent fluid removed. Cultures pending. If symptoms worsen or fail to improve, may require surgical intervention. HM consulted for medical management of HTN. Currently holding Benicar-HCT due to borderline hypotension. Will continue to monitor and restart once BP allows. If BP remains hypotensive upon discharge, will continue to hold BP medication and F/U with PCP for BP check. CT renal stone study showed alveolar consolidation scattered throughout the left lower lobe, characteristic of pneumonia. Pt reports having loose cough over a week with chills. Will obtain sputum culture with blood cultures x 2. IS, inhaled medications ordered and will  continue IV Zosyn.     Interval History: Pt seen and examined. Pt has no complaints at this time.     Review of Systems   Constitutional: Positive for activity change. Negative for chills and fever.   HENT: Negative.    Eyes: Negative.    Respiratory: Negative for apnea, cough, choking, chest tightness, shortness of breath, wheezing and stridor.    Cardiovascular: Negative for chest pain, palpitations and leg swelling.   Gastrointestinal: Negative for abdominal pain, constipation, diarrhea, nausea and vomiting.   Endocrine: Negative.    Genitourinary: Negative.    Musculoskeletal: Negative for arthralgias, back pain, gait problem, myalgias, neck pain and neck stiffness.   Skin: Negative.    Allergic/Immunologic: Negative.    Neurological: Negative for dizziness, tremors, seizures, syncope, facial asymmetry, speech difficulty, weakness, light-headedness, numbness and headaches.   Hematological: Negative.    Psychiatric/Behavioral: Negative.      Objective:     Vital Signs (Most Recent):  Temp: 98.6 °F (37 °C) (09/08/18 1135)  Pulse: 89 (09/08/18 1135)  Resp: 18 (09/08/18 1135)  BP: (!) 118/59 (09/08/18 1135)  SpO2: 99 % (09/08/18 1135) Vital Signs (24h Range):  Temp:  [98.4 °F (36.9 °C)-100.4 °F (38 °C)] 98.6 °F (37 °C)  Pulse:  [76-91] 89  Resp:  [11-24] 18  SpO2:  [94 %-100 %] 99 %  BP: (103-136)/(52-65) 118/59     Weight: 118 kg (260 lb 2.6 oz)  Body mass index is 44.66 kg/m².    Intake/Output Summary (Last 24 hours) at 9/8/2018 1227  Last data filed at 9/8/2018 0916  Gross per 24 hour   Intake 2727.91 ml   Output 30 ml   Net 2697.91 ml      Physical Exam   Constitutional: She is oriented to person, place, and time. She appears well-developed and well-nourished. She is cooperative. She is easily aroused. No distress.   HENT:   Head: Normocephalic and atraumatic.   Eyes: EOM are normal.   Neck: Normal range of motion. Neck supple.   Cardiovascular: Normal rate, regular rhythm, normal heart sounds and intact distal  pulses.   No murmur heard.  Pulmonary/Chest: Effort normal. No stridor. No respiratory distress. She has wheezes (mild expiratory wheezing noted) in the right middle field, the right lower field and the left lower field. She has no rales. She exhibits no tenderness.   Loose cough noted   Abdominal: Soft. Bowel sounds are normal. She exhibits no distension. There is no tenderness. There is no rebound and no guarding.   Genitourinary:   Genitourinary Comments: Deferred   Musculoskeletal: She exhibits no edema, tenderness or deformity.   Neurological: She is alert, oriented to person, place, and time and easily aroused. No sensory deficit.   Skin: Skin is warm and dry. Capillary refill takes less than 2 seconds.        Psychiatric: She has a normal mood and affect. Her behavior is normal. Judgment and thought content normal.   Nursing note and vitals reviewed.      Significant Labs:   BMP:   Recent Labs   Lab  09/08/18   0528   GLU  97   NA  142   K  3.2*   CL  101   CO2  32*   BUN  12   CREATININE  0.9   CALCIUM  8.8   MG  2.5     CBC:   Recent Labs   Lab  09/07/18   0859  09/08/18   0528   WBC  17.55*  14.46*   HGB  8.3*  8.3*   HCT  25.9*  26.7*   PLT  534*  538*     Magnesium:   Recent Labs   Lab  09/07/18   1731  09/08/18   0528   MG  2.7*  2.5     POCT Glucose:   Recent Labs   Lab  09/07/18   1310  09/08/18   1054   POCTGLUCOSE  117*  109       Significant Imaging:   Imaging Results          CT Guided Abscess Drainage With Catheter (Final result)  Result time 09/07/18 16:23:47    Final result by Andres Menchaca MD (09/07/18 16:23:47)                 Impression:      CT guided abscess strain as above.  Follow-up cultures    All CT scans at this facility use dose modulation, iterative reconstruction, and/or weight based dosing when appropriate to reduce radiation dose to as low as reasonable achievable.      Electronically signed by: Andres Menchaca MD  Date:    09/07/2018  Time:    16:23             Narrative:     EXAMINATION:  CT GUIDED ABSCESS DRAINAGE WITH CATHETER    CLINICAL HISTORY:  Intra abdominal abscess;    TECHNIQUE:  Medications:    Moderate sedation using versed and fentanyl was provided with and trained observer monitoring the patient's vital signs and level of consciousness. The total time of sedation was 30 min.    1% lidocaine locally    :YOVANI Menchaca    Complications: None    COMPARISON:  09/07/2018    FINDINGS:  Procedure: The risks and benefits of this procedure were discussed with the patient, written informed consent was obtained.  The patient was placed supine on the CT gantry.  Preprocedural imaging revealed cul de sac fluid collection.  A suitable skin site for drainage was selected.  IV fentanyl and Versed was given for conscious sedation.  The skin site was prepped and draped in sterile fashion.  The skin was anesthetized with 1% lidocaine.    Using CT guidance a 19-gauge introducer needle was guided into the collection via a posterior approach with the patient in prone position.  Prior to wire placement appropriate needle positioning was confirmed with CT.   Wire was advanced within the collection.  Images demonstrated appropriate positioning.  Tract was dilated to 8 Wolof and an 8.5 Wolof APD catheter was placed within the collection.  There is return of approximately 30 cc of clarissa pus.  Catheter was secured at the skin surface..    Postprocedural imaging was acquired. The site was bandaged sterilely. The patient left the room in stable condition.    Findings:    1.  Preprocedural CT showed cul de sac fluid collection with thick wall.    2.  CT-guided drainage    3.  Post procedural CT showed no complication and appropriate positioning of the drainage catheter.                               CT Renal Stone Study Abd Pelvis WO (Final result)  Result time 09/07/18 10:51:06    Final result by DIMPLE Greer Sr., MD (09/07/18 10:51:06)                 Impression:      1. There is a poorly  visualized fluid collection between the uterus and the rectum. This fluid collection measures approximately 4 cm in craniocaudal dimension. There is no gas visualized within this fluid collection. In the mesenteric portion of the inferior aspect of the abdomen and in the pelvis  there are several fluid and gas collections that have moderate adjacent inflammatory changes. The larger 1 in this area measures 4 cm.  These findings are worrisome for abscesses.  The examination is limited secondary to the lack of oral and IV contrast.  2. There is a mild amount of alveolar consolidation scattered throughout the left lower lobe.  This is characteristic of pneumonia.  3. There is a 3 mm nonobstructive stone in the inferior pole of the left kidney.  4. The liver is enlarged. It measures 22.2 cm in craniocaudal dimension.  5. There is a 3 mm nonobstructive stone in the inferior pole of the left kidney.  6. The uterus and ovaries were not well seen.  7. There is a moderate size umbilical hernia. The width of the mouth of this hernia measures 51 mm. A segment of the transverse portion of the colon is in the region of the mouth of this hernia.  8. The above findings and impressions were discussed with Dr. Costello via the telephone at 10:40 on 09/07/2018.  All CT scans at this facility use dose modulation, iterative reconstruction, and/or weight base dosing when appropriate to reduce radiation dose when appropriate to reduce radiation dose to as low as reasonably achievable.      Electronically signed by: Raghavendra Greer MD  Date:    09/07/2018  Time:    10:51             Narrative:    EXAMINATION:  CT RENAL STONE STUDY ABD PELVIS WO    CLINICAL HISTORY:  Abdominal pain, unspecified;    TECHNIQUE:  Standard abdomen and pelvis CT protocol without oral or IV contrast was performed.    FINDINGS:  Finding: The size of the heart is within normal limits.  There is a mild amount of alveolar consolidation scattered throughout the left  lower lobe.  The visualized portion of the right lung is clear.  There is no pneumothorax or pleural effusion.    The liver is enlarged.  It measures 22.2 cm in craniocaudal dimension.  The gallbladder, pancreas, spleen, adrenals, and right kidney are normal in appearance.  There is a 3 mm nonobstructive stone in the inferior pole of the left kidney.  The ureters and the urinary bladder are normal in appearance. The appendix is not visualized.  There is a poorly visualized fluid collection between the uterus and the rectum.  This fluid collection measures approximately 4 cm in craniocaudal dimension.  There is no gas visualized within this fluid collection.  In the mesenteric portion of the inferior aspect of the abdomen and in the pelvis there are several fluid and gas collections that have moderate adjacent inflammatory changes.  The larger 1 in this area measures 4 cm.  There is no free fluid within the abdomen or pelvis. There is no pneumoperitoneum.  The uterus and ovaries were not well seen.  There is a moderate size umbilical hernia.  The width of the mouth of this hernia measures 51 mm.  A segment of the transverse portion of the colon is in the region of the mouth of this hernia.                              Assessment/Plan:      * Diverticulitis of colon with perforation    - General surgery primary -- management per primary team  - Continue IV Zosyn  - May require surgery if condition worsens or fails to improve  - Diet advanced to clear liquid   - Analgesics prn          Pelvic abscess in female    - IR consulted by General surgery  - 8.3 Kinyarwanda drainage catheter placed on 09/07/18. Removed 45 ccs of purulent fluid and sent for cultures -- which are currently pending               Hypokalemia    - K+ 3.2  - Mg level unremarkable  - Repleted with KCL 30 mEq PO x 1   - Telemetry monitoring  - BMP in AM        Pneumonia due to infectious organism    - CT renal stone study showed alveolar consolidation of  left lower lobe characteristic of pneumonia  - Obtain blood/sputum cultures  - Supplemental oxygen prn, keep O2 sats > 92%  - IS and inhaled medications ordered. CXR pending  - Pt currently receiving IV Zosyn, will continue           Essential hypertension    - Due to borderline hypotension, will continue to hold Benicar-HCT at this time  - Will monitor BP trends and restart medication once BP allows  - If remains hypotensive upon discharge, continue to hold BP medication and F/U with PCP            VTE Risk Mitigation (From admission, onward)        Ordered     enoxaparin injection 40 mg  Daily      09/07/18 1228     IP VTE HIGH RISK PATIENT  Once      09/07/18 1228              LYNDON James  Department of Hospital Medicine   [facility]

## 2018-09-08 NOTE — ASSESSMENT & PLAN NOTE
- IR consulted by General surgery  - 8.3 Romanian drainage catheter placed on 09/07/18. Removed 45 ccs of purulent fluid and sent for cultures -- which are currently pending

## 2018-09-08 NOTE — PLAN OF CARE
Discharge planning assessment completed with patient's assistance.  Patient from home and independent.  Patient has two children, 20 yr and 4 yr old.  Patient had no DME, OP HD, HH, or inpatient hospital stay within the last 30 days.  Patient's PCP is Dr. Elijah Renee.  Patient's prefered pharmacy is [facility].  Discharge disposition is to home.  No needs identified.  Patient provided with a healthcare packet and contents explained.        09/08/18 1200   Discharge Assessment   Assessment Type Discharge Planning Assessment   Confirmed/corrected address and phone number on facesheet? Yes   Assessment information obtained from? Patient   Communicated expected length of stay with patient/caregiver no   Prior to hospitilization cognitive status: Alert/Oriented   Prior to hospitalization functional status: Independent   Current cognitive status: Alert/Oriented   Current Functional Status: Independent   Facility Arrived From: home   Lives With child(dell), adult;child(dell), dependent  (20 year old and 4 yr old children.)   Able to Return to Prior Arrangements yes   Is patient able to care for self after discharge? Yes   Who are your caregiver(s) and their phone number(s)? Alexandria Seth; mother; [phone]   Patient's perception of discharge disposition home or selfcare   Readmission Within The Last 30 Days no previous admission in last 30 days   Patient currently being followed by outpatient case management? No   Patient currently receives any other outside agency services? No   Equipment Currently Used at Home none   Do you have any problems affording any of your prescribed medications? No   Is the patient taking medications as prescribed? yes   Dialysis Name and Scheduled days n/a   Does the patient receive services at the [facility]mad[facility]? No   Discharge Plan A Home   Discharge Plan B Home   Patient/Family In Agreement With Plan yes

## 2018-09-08 NOTE — PLAN OF CARE
Problem: Patient Care Overview  Goal: Plan of Care Review  Outcome: Ongoing (interventions implemented as appropriate)  Pt remains free from injury and falls. Fall precautions in place. Pt turns independently in bed. Tolerating clear liquid diet. Blood glucose monitoring continues. Denies pain and nausea. Pt's left posterior lumbar LAURA remains clean, dry, and intact, draining yellow pus. Drain care completed. Meds given as ordered. Pt able to verbalize wants and needs. Bed in low, locked position, call light and personal items within reach of pt. POC discussed with pt. Verbalized understanding. VSS. Will continue to monitor.

## 2018-09-08 NOTE — PLAN OF CARE
Pt remains free from injury and falls. Fall precautions in place. Pt turns independently in bed. Tolerating clear liquid diet. Blood glucose monitoring continues. Denies nausea. PRN apin med given for pain. Pt's left posterior lumbar LAURA remains clean, dry, and intact, draining yellow pus. Meds given as ordered. Pt able to verbalize wants and needs. Bed in low, locked position, call light and personal items within reach of pt. POC discussed with pt. Verbalized understanding. VSS. Will continue to monitor.

## 2018-09-08 NOTE — PLAN OF CARE
Problem: Patient Care Overview  Goal: Plan of Care Review  Outcome: Ongoing (interventions implemented as appropriate)  Plan of care reviewed. No acute distress noted.Safety measures are in place. LAURA drain in place. Pain is controlled. Call light within reach. Will continue to monitor. Chart check complete.

## 2018-09-08 NOTE — ASSESSMENT & PLAN NOTE
- CT renal stone study showed alveolar consolidation of left lower lobe characteristic of pneumonia  - Obtain blood/sputum cultures  - Supplemental oxygen prn, keep O2 sats > 92%  - IS and inhaled medications ordered. CXR pending  - Pt currently receiving IV Zosyn, will continue

## 2018-09-09 LAB
ANION GAP SERPL CALC-SCNC: 12 MMOL/L
BASOPHILS # BLD AUTO: 0.01 K/UL
BASOPHILS NFR BLD: 0.1 %
BUN SERPL-MCNC: 7 MG/DL
CALCIUM SERPL-MCNC: 8.6 MG/DL
CHLORIDE SERPL-SCNC: 100 MMOL/L
CO2 SERPL-SCNC: 27 MMOL/L
CREAT SERPL-MCNC: 0.8 MG/DL
DIFFERENTIAL METHOD: ABNORMAL
EOSINOPHIL # BLD AUTO: 0.4 K/UL
EOSINOPHIL NFR BLD: 3 %
ERYTHROCYTE [DISTWIDTH] IN BLOOD BY AUTOMATED COUNT: 17.2 %
EST. GFR  (AFRICAN AMERICAN): >60 ML/MIN/1.73 M^2
EST. GFR  (NON AFRICAN AMERICAN): >60 ML/MIN/1.73 M^2
GLUCOSE SERPL-MCNC: 99 MG/DL
HCT VFR BLD AUTO: 24.2 %
HGB BLD-MCNC: 7.5 G/DL
LYMPHOCYTES # BLD AUTO: 1.4 K/UL
LYMPHOCYTES NFR BLD: 9.9 %
MCH RBC QN AUTO: 24 PG
MCHC RBC AUTO-ENTMCNC: 31 G/DL
MCV RBC AUTO: 77 FL
MONOCYTES # BLD AUTO: 0.9 K/UL
MONOCYTES NFR BLD: 6.7 %
NEUTROPHILS # BLD AUTO: 11 K/UL
NEUTROPHILS NFR BLD: 80.3 %
PLATELET # BLD AUTO: 515 K/UL
PMV BLD AUTO: 8.4 FL
POTASSIUM SERPL-SCNC: 3.4 MMOL/L
RBC # BLD AUTO: 3.13 M/UL
SODIUM SERPL-SCNC: 139 MMOL/L
WBC # BLD AUTO: 13.65 K/UL

## 2018-09-09 PROCEDURE — 36415 COLL VENOUS BLD VENIPUNCTURE: CPT

## 2018-09-09 PROCEDURE — 63600175 PHARM REV CODE 636 W HCPCS: Performed by: SURGERY

## 2018-09-09 PROCEDURE — 99231 SBSQ HOSP IP/OBS SF/LOW 25: CPT | Mod: ,,, | Performed by: SURGERY

## 2018-09-09 PROCEDURE — 25000003 PHARM REV CODE 250: Performed by: SURGERY

## 2018-09-09 PROCEDURE — 11000001 HC ACUTE MED/SURG PRIVATE ROOM

## 2018-09-09 PROCEDURE — 80048 BASIC METABOLIC PNL TOTAL CA: CPT

## 2018-09-09 PROCEDURE — 99900035 HC TECH TIME PER 15 MIN (STAT)

## 2018-09-09 PROCEDURE — 85025 COMPLETE CBC W/AUTO DIFF WBC: CPT

## 2018-09-09 PROCEDURE — 94799 UNLISTED PULMONARY SVC/PX: CPT

## 2018-09-09 RX ADMIN — PIPERACILLIN AND TAZOBACTAM 4.5 G: 4; .5 INJECTION, POWDER, LYOPHILIZED, FOR SOLUTION INTRAVENOUS; PARENTERAL at 12:09

## 2018-09-09 RX ADMIN — ENOXAPARIN SODIUM 40 MG: 40 INJECTION SUBCUTANEOUS at 04:09

## 2018-09-09 RX ADMIN — POLYETHYLENE GLYCOL 3350 17 G: 17 POWDER, FOR SOLUTION ORAL at 08:09

## 2018-09-09 RX ADMIN — PIPERACILLIN AND TAZOBACTAM 4.5 G: 4; .5 INJECTION, POWDER, LYOPHILIZED, FOR SOLUTION INTRAVENOUS; PARENTERAL at 04:09

## 2018-09-09 RX ADMIN — PIPERACILLIN AND TAZOBACTAM 4.5 G: 4; .5 INJECTION, POWDER, LYOPHILIZED, FOR SOLUTION INTRAVENOUS; PARENTERAL at 08:09

## 2018-09-09 RX ADMIN — MORPHINE SULFATE 2 MG: 4 INJECTION INTRAVENOUS at 10:09

## 2018-09-09 RX ADMIN — HYDROCODONE BITARTRATE AND ACETAMINOPHEN 2 TABLET: 5; 325 TABLET ORAL at 08:09

## 2018-09-09 RX ADMIN — DEXTROSE MONOHYDRATE, SODIUM CHLORIDE, AND POTASSIUM CHLORIDE: 50; 4.5; 1.49 INJECTION, SOLUTION INTRAVENOUS at 08:09

## 2018-09-09 NOTE — ASSESSMENT & PLAN NOTE
- K+ 3.2 on 09/08/18 -- repleted with KCL 30 mEq PO x 1   - Currently receiving continues IVF with KCL 20 mEq  - Repeat BMP pending  - Telemetry monitoring  - Will continue to monitor and replete as needed

## 2018-09-09 NOTE — PLAN OF CARE
Pt remains free from injury and falls. Fall precautions in place. Pt turns independently in bed. Tolerating low fiber/low residue diet. Denies nausea. PRN pain med given for pain. Pt's left posterior lumbar LAURA remains clean, dry, and intact, draining yellow pus. PRN pain med given for pain. Meds given as ordered. Pt able to verbalize wants and needs. Bed in low, locked position, call light and personal items within reach of pt. POC discussed with pt and family. Verbalized understanding. VSS. Will continue to monitor.

## 2018-09-09 NOTE — SUBJECTIVE & OBJECTIVE
Interval History: no bowel movement, pain improved, tolerating clears    Medications:  Continuous Infusions:   dextrose 5 % and 0.45 % NaCl with KCl 20 mEq 125 mL/hr at 09/09/18 0851     Scheduled Meds:   enoxaparin  40 mg Subcutaneous Daily    lidocaine (PF) 10 mg/ml (1%)  1 mL Other Once    piperacillin-tazobactam 4.5 g in dextrose 5 % 100 mL IVPB (ready to mix system)  4.5 g Intravenous Q8H    polyethylene glycol  17 g Oral Daily     PRN Meds:acetaminophen, albuterol-ipratropium, dextrose 50%, diphenhydrAMINE, glucagon (human recombinant), hydrALAZINE, HYDROcodone-acetaminophen, morphine, ondansetron, promethazine (PHENERGAN) IVPB, ramelteon     Review of patient's allergies indicates:  No Known Allergies  Objective:     Vital Signs (Most Recent):  Temp: 98.9 °F (37.2 °C) (09/09/18 0756)  Pulse: 75 (09/09/18 0756)  Resp: 18 (09/09/18 0756)  BP: (!) 111/57 (09/09/18 0756)  SpO2: 99 % (09/09/18 0756) Vital Signs (24h Range):  Temp:  [97.8 °F (36.6 °C)-100.1 °F (37.8 °C)] 98.9 °F (37.2 °C)  Pulse:  [] 75  Resp:  [16-18] 18  SpO2:  [96 %-100 %] 99 %  BP: (111-164)/(55-79) 111/57     Weight: 118 kg (260 lb 2.6 oz)  Body mass index is 44.66 kg/m².    Intake/Output - Last 3 Shifts       09/07 0700 - 09/08 0659 09/08 0700 - 09/09 0659 09/09 0700 - 09/10 0659    P.O.  600 180    I.V. (mL/kg) 1601.1 (13.6) 4487.5 (38)     Other 10      IV Piggyback 100 300 100    Total Intake(mL/kg) 1711.1 (14.5) 5387.5 (45.7) 280 (2.4)    Urine (mL/kg/hr) 0 0 (0)     Drains 30 35     Total Output 30 35     Net +1681.1 +5352.5 +280           Urine Occurrence 4 x 5 x 1 x          Physical Exam   Constitutional: She is oriented to person, place, and time. She appears well-nourished. No distress.   Cardiovascular: Normal rate and regular rhythm.   Murmur heard.  Pulmonary/Chest: Effort normal and breath sounds normal.   Abdominal: Soft. She exhibits no distension. There is no tenderness.   LAURA drain with purulent drainage    Neurological: She is alert and oriented to person, place, and time.   Vitals reviewed.      Significant Labs:  CBC:   Recent Labs   Lab  09/09/18   0841   WBC  13.65*   RBC  3.13*   HGB  7.5*   HCT  24.2*   PLT  515*   MCV  77*   MCH  24.0*   MCHC  31.0*     BMP:   Recent Labs   Lab  09/08/18   0528   GLU  97   NA  142   K  3.2*   CL  101   CO2  32*   BUN  12   CREATININE  0.9   CALCIUM  8.8   MG  2.5       Significant Diagnostics:  I have reviewed all pertinent imaging results/findings within the past 24 hours.

## 2018-09-09 NOTE — SUBJECTIVE & OBJECTIVE
"Interval History: Pt seen and examined. Pt currently c/o left posterior pelvic pain. Pt reports she just received PRN pain medication for it and "the pain is getting better."     Review of Systems   Constitutional: Positive for activity change. Negative for chills and fever.   HENT: Negative.    Eyes: Negative.    Respiratory: Negative for apnea, cough, choking, chest tightness, shortness of breath, wheezing and stridor.    Cardiovascular: Negative for chest pain, palpitations and leg swelling.   Gastrointestinal: Negative for abdominal pain, constipation, diarrhea, nausea and vomiting.   Endocrine: Negative.    Genitourinary: Negative.    Musculoskeletal: Negative for arthralgias, back pain, gait problem, myalgias, neck pain and neck stiffness.   Skin: Negative.    Allergic/Immunologic: Negative.    Neurological: Negative for dizziness, tremors, seizures, syncope, facial asymmetry, speech difficulty, weakness, light-headedness, numbness and headaches.   Hematological: Negative.    Psychiatric/Behavioral: Negative.      Objective:     Vital Signs (Most Recent):  Temp: 98.3 °F (36.8 °C) (09/09/18 1159)  Pulse: 75 (09/09/18 1159)  Resp: 20 (09/09/18 1159)  BP: 131/71 (09/09/18 1159)  SpO2: 97 % (09/09/18 1159) Vital Signs (24h Range):  Temp:  [97.8 °F (36.6 °C)-100.1 °F (37.8 °C)] 98.3 °F (36.8 °C)  Pulse:  [] 75  Resp:  [16-20] 20  SpO2:  [96 %-100 %] 97 %  BP: (111-164)/(55-79) 131/71     Weight: 118 kg (260 lb 2.6 oz)  Body mass index is 44.66 kg/m².    Intake/Output Summary (Last 24 hours) at 9/9/2018 1256  Last data filed at 9/9/2018 0853  Gross per 24 hour   Intake 3551.67 ml   Output 35 ml   Net 3516.67 ml      Physical Exam   Constitutional: She is oriented to person, place, and time. She appears well-developed and well-nourished. She is cooperative. She is easily aroused. No distress.   HENT:   Head: Normocephalic and atraumatic.   Eyes: EOM are normal.   Neck: Normal range of motion. Neck supple. "   Cardiovascular: Normal rate, regular rhythm, normal heart sounds and intact distal pulses.   No murmur heard.  Pulmonary/Chest: Effort normal. No stridor. No respiratory distress. She has decreased breath sounds in the right upper field, the right middle field, the right lower field, the left upper field and the left lower field. She has no wheezes. She has no rales. She exhibits no tenderness.   Abdominal: Soft. Bowel sounds are normal. She exhibits no distension. There is no tenderness. There is no rebound and no guarding.   Genitourinary:   Genitourinary Comments: Deferred   Musculoskeletal: She exhibits no edema, tenderness or deformity.   Neurological: She is alert, oriented to person, place, and time and easily aroused. No sensory deficit.   Skin: Skin is warm and dry. Capillary refill takes less than 2 seconds.        Psychiatric: She has a normal mood and affect. Her behavior is normal. Judgment and thought content normal.   Nursing note and vitals reviewed.      Significant Labs:   CBC:   Recent Labs   Lab  09/08/18   0528  09/09/18   0841   WBC  14.46*  13.65*   HGB  8.3*  7.5*   HCT  26.7*  24.2*   PLT  538*  515*       Significant Imaging:   Imaging Results          CT Guided Abscess Drainage With Catheter (Final result)  Result time 09/07/18 16:23:47    Final result by Andres Menchaca MD (09/07/18 16:23:47)                 Impression:      CT guided abscess strain as above.  Follow-up cultures    All CT scans at this facility use dose modulation, iterative reconstruction, and/or weight based dosing when appropriate to reduce radiation dose to as low as reasonable achievable.      Electronically signed by: Andres Menchaca MD  Date:    09/07/2018  Time:    16:23             Narrative:    EXAMINATION:  CT GUIDED ABSCESS DRAINAGE WITH CATHETER    CLINICAL HISTORY:  Intra abdominal abscess;    TECHNIQUE:  Medications:    Moderate sedation using versed and fentanyl was provided with and trained observer  monitoring the patient's vital signs and level of consciousness. The total time of sedation was 30 min.    1% lidocaine locally    :YOVANI Menchaca    Complications: None    COMPARISON:  09/07/2018    FINDINGS:  Procedure: The risks and benefits of this procedure were discussed with the patient, written informed consent was obtained.  The patient was placed supine on the CT gantry.  Preprocedural imaging revealed cul de sac fluid collection.  A suitable skin site for drainage was selected.  IV fentanyl and Versed was given for conscious sedation.  The skin site was prepped and draped in sterile fashion.  The skin was anesthetized with 1% lidocaine.    Using CT guidance a 19-gauge introducer needle was guided into the collection via a posterior approach with the patient in prone position.  Prior to wire placement appropriate needle positioning was confirmed with CT.   Wire was advanced within the collection.  Images demonstrated appropriate positioning.  Tract was dilated to 8 Ukrainian and an 8.5 Ukrainian APD catheter was placed within the collection.  There is return of approximately 30 cc of clarissa pus.  Catheter was secured at the skin surface..    Postprocedural imaging was acquired. The site was bandaged sterilely. The patient left the room in stable condition.    Findings:    1.  Preprocedural CT showed cul de sac fluid collection with thick wall.    2.  CT-guided drainage    3.  Post procedural CT showed no complication and appropriate positioning of the drainage catheter.                               CT Renal Stone Study Abd Pelvis WO (Final result)  Result time 09/07/18 10:51:06    Final result by DIMPLE Greer Sr., MD (09/07/18 10:51:06)                 Impression:      1. There is a poorly visualized fluid collection between the uterus and the rectum. This fluid collection measures approximately 4 cm in craniocaudal dimension. There is no gas visualized within this fluid collection. In the mesenteric  portion of the inferior aspect of the abdomen and in the pelvis  there are several fluid and gas collections that have moderate adjacent inflammatory changes. The larger 1 in this area measures 4 cm.  These findings are worrisome for abscesses.  The examination is limited secondary to the lack of oral and IV contrast.  2. There is a mild amount of alveolar consolidation scattered throughout the left lower lobe.  This is characteristic of pneumonia.  3. There is a 3 mm nonobstructive stone in the inferior pole of the left kidney.  4. The liver is enlarged. It measures 22.2 cm in craniocaudal dimension.  5. There is a 3 mm nonobstructive stone in the inferior pole of the left kidney.  6. The uterus and ovaries were not well seen.  7. There is a moderate size umbilical hernia. The width of the mouth of this hernia measures 51 mm. A segment of the transverse portion of the colon is in the region of the mouth of this hernia.  8. The above findings and impressions were discussed with Dr. Costello via the telephone at 10:40 on 09/07/2018.  All CT scans at this facility use dose modulation, iterative reconstruction, and/or weight base dosing when appropriate to reduce radiation dose when appropriate to reduce radiation dose to as low as reasonably achievable.      Electronically signed by: Raghavendra Greer MD  Date:    09/07/2018  Time:    10:51             Narrative:    EXAMINATION:  CT RENAL STONE STUDY ABD PELVIS WO    CLINICAL HISTORY:  Abdominal pain, unspecified;    TECHNIQUE:  Standard abdomen and pelvis CT protocol without oral or IV contrast was performed.    FINDINGS:  Finding: The size of the heart is within normal limits.  There is a mild amount of alveolar consolidation scattered throughout the left lower lobe.  The visualized portion of the right lung is clear.  There is no pneumothorax or pleural effusion.    The liver is enlarged.  It measures 22.2 cm in craniocaudal dimension.  The gallbladder, pancreas,  spleen, adrenals, and right kidney are normal in appearance.  There is a 3 mm nonobstructive stone in the inferior pole of the left kidney.  The ureters and the urinary bladder are normal in appearance. The appendix is not visualized.  There is a poorly visualized fluid collection between the uterus and the rectum.  This fluid collection measures approximately 4 cm in craniocaudal dimension.  There is no gas visualized within this fluid collection.  In the mesenteric portion of the inferior aspect of the abdomen and in the pelvis there are several fluid and gas collections that have moderate adjacent inflammatory changes.  The larger 1 in this area measures 4 cm.  There is no free fluid within the abdomen or pelvis. There is no pneumoperitoneum.  The uterus and ovaries were not well seen.  There is a moderate size umbilical hernia.  The width of the mouth of this hernia measures 51 mm.  A segment of the transverse portion of the colon is in the region of the mouth of this hernia.

## 2018-09-09 NOTE — PROGRESS NOTES
[facility] -   General Surgery  Progress Note    Subjective:     History of Present Illness:  37-year-old female referred for pelvic abscess.  The patient reports lower pelvic abdominal pain for a week.  She endorses intermittent constipation with passing some liquid bowel movements.  She denies any fevers, chills, urinary difficulties.    Post-Op Info:  * No surgery found *         Interval History: no bowel movement, pain improved, tolerating clears    Medications:  Continuous Infusions:   dextrose 5 % and 0.45 % NaCl with KCl 20 mEq 125 mL/hr at 09/09/18 0851     Scheduled Meds:   enoxaparin  40 mg Subcutaneous Daily    lidocaine (PF) 10 mg/ml (1%)  1 mL Other Once    piperacillin-tazobactam 4.5 g in dextrose 5 % 100 mL IVPB (ready to mix system)  4.5 g Intravenous Q8H    polyethylene glycol  17 g Oral Daily     PRN Meds:acetaminophen, albuterol-ipratropium, dextrose 50%, diphenhydrAMINE, glucagon (human recombinant), hydrALAZINE, HYDROcodone-acetaminophen, morphine, ondansetron, promethazine (PHENERGAN) IVPB, ramelteon     Review of patient's allergies indicates:  No Known Allergies  Objective:     Vital Signs (Most Recent):  Temp: 98.9 °F (37.2 °C) (09/09/18 0756)  Pulse: 75 (09/09/18 0756)  Resp: 18 (09/09/18 0756)  BP: (!) 111/57 (09/09/18 0756)  SpO2: 99 % (09/09/18 0756) Vital Signs (24h Range):  Temp:  [97.8 °F (36.6 °C)-100.1 °F (37.8 °C)] 98.9 °F (37.2 °C)  Pulse:  [] 75  Resp:  [16-18] 18  SpO2:  [96 %-100 %] 99 %  BP: (111-164)/(55-79) 111/57     Weight: 118 kg (260 lb 2.6 oz)  Body mass index is 44.66 kg/m².    Intake/Output - Last 3 Shifts       09/07 0700 - 09/08 0659 09/08 0700 - 09/09 0659 09/09 0700 - 09/10 0659    P.O.  600 180    I.V. (mL/kg) 1601.1 (13.6) 4487.5 (38)     Other 10      IV Piggyback 100 300 100    Total Intake(mL/kg) 1711.1 (14.5) 5387.5 (45.7) 280 (2.4)    Urine (mL/kg/hr) 0 0 (0)     Drains 30 35     Total Output 30 35     Net +1681.1 +5352.5 +280            Urine Occurrence 4 x 5 x 1 x          Physical Exam   Constitutional: She is oriented to person, place, and time. She appears well-nourished. No distress.   Cardiovascular: Normal rate and regular rhythm.   Murmur heard.  Pulmonary/Chest: Effort normal and breath sounds normal.   Abdominal: Soft. She exhibits no distension. There is no tenderness.   LAURA drain with purulent drainage   Neurological: She is alert and oriented to person, place, and time.   Vitals reviewed.      Significant Labs:  CBC:   Recent Labs   Lab  09/09/18   0841   WBC  13.65*   RBC  3.13*   HGB  7.5*   HCT  24.2*   PLT  515*   MCV  77*   MCH  24.0*   MCHC  31.0*     BMP:   Recent Labs   Lab  09/08/18   0528   GLU  97   NA  142   K  3.2*   CL  101   CO2  32*   BUN  12   CREATININE  0.9   CALCIUM  8.8   MG  2.5       Significant Diagnostics:  I have reviewed all pertinent imaging results/findings within the past 24 hours.    Assessment/Plan:     * Diverticulitis of colon with perforation    Clear liquids advance diet as tolerated, IV fluids, IV antibiotics  LARUA drain with purulent output  Abdominal pain improving  Bowel regimen  Will transition to oral medications once bowel function returning        Hypokalemia    Replete potassium        Essential hypertension    IV hydralazine p.r.n.  Will restart home medications once tolerating p.o.        Pelvic abscess in female    LAURA drain with purulent drainage            Daniel Romeo MD  General Surgery  [facility] -

## 2018-09-09 NOTE — ASSESSMENT & PLAN NOTE
Clear liquids advance diet as tolerated, IV fluids, IV antibiotics  LAURA drain with purulent output  Abdominal pain improving  Bowel regimen  Will transition to oral medications once bowel function returning

## 2018-09-09 NOTE — PLAN OF CARE
Problem: Patient Care Overview  Goal: Plan of Care Review  Outcome: Ongoing (interventions implemented as appropriate)  No acute distress noted. Safety measures are in place. Pain is being managed. Iv fluids infusing. Pt free of falls this shift. LAURA drain in place, clean, dry and intact. Will continue to monitor. Chart check complete.

## 2018-09-09 NOTE — ASSESSMENT & PLAN NOTE
- IR consulted by General surgery  - 8.3 Italian drainage catheter placed on 09/07/18. Removed 45 ccs of purulent fluid   - Left buttock cultures -- (+) Streptococcus anginosus and gram negative poncho, lactose . Susceptibilities pending. May need to consult ID for antibiotic recommendations upon discharge (susceptibilities not normally ran on Streptococcus anginosis)   - Continue IV Zosyn

## 2018-09-09 NOTE — ASSESSMENT & PLAN NOTE
- CT renal stone study upon admit showed alveolar consolidation of left lower lobe characteristic of pneumonia  - CXR on 09/08/18 showed new infiltrates or discoid atelectasis in the right lower lung.  Small ground-glass infiltrates in left lung base seen on prior CT are not visualized radiographically  - Blood cultures with NGTD  - Sputum culture pending  - Continue supplemental oxygen prn, keep O2 sats > 92%  - Continue IS and PRN inhaled medications   - Pt currently receiving IV Zosyn, will continue

## 2018-09-09 NOTE — PROGRESS NOTES
[facility] Hospital Medicine  Progress Note    Patient Name: Lulú Seth  MRN: [id number]  Patient Class: IP- Inpatient   Admission Date: 9/7/2018  Length of Stay: 2 days  Attending Physician: Daniel Romeo MD  Primary Care Provider: Elijah Velarde MD        Subjective:     Principal Problem:Diverticulitis of colon with perforation    HPI:  Lulú Seth is a 37 year old female admitted by General surgery for perforated diverticulitis with intra abdominal abscess. IR was consulted by General surgery today for drainage of abscess -- 8.3 Hebrew drainage catheter placed and approx 45 ccs of purulent fluid removed and sent to lab. She may require surgery if condition worsens or fails to improve with IVF and IV antibiotics. HM consulted for medical management of HTN. Pt currently takes Benicar-HCT 40-25 mg PO daily for HTN. Will hold antihypertensives for now due to borderline hypotension. Will continue to monitor and restart medication once BP allows.    Hospital Course:  Lulú Seth is a 37 year old female admitted by General surgery for perforated diverticulitis with intra abdominal abscess. IR was consulted by General surgery upon admit for drainage of abscess -- 8.3 Hebrew drainage catheter placed and approx 45 ccs of purulent fluid removed. Cultures pending. If symptoms worsen or fail to improve, may require surgical intervention. HM consulted for medical management of HTN. Currently holding Benicar-HCT due to borderline hypotension. Will continue to monitor and restart once BP allows. If BP remains hypotensive upon discharge, will continue to hold BP medication and F/U with PCP for BP check. CT renal stone study showed alveolar consolidation scattered throughout the left lower lobe, characteristic of pneumonia. Pt reports having loose cough over a week with chills. Will obtain sputum culture with blood cultures x 2. IS, inhaled medications ordered and will  "continue IV Zosyn. As of 09/09 Max temp overnight 100.1. Leukocytosis trending down, currently 13.65K from 14.46K. Blood cultures with NGTD. Left buttock wound cultures (+) Streptococcus anginosis and gram negative poncho, lactose . Susceptibilities pending. May need to consult ID for antibiotic recommendations as streptococcus anginosis susceptibilities are not routinely done. Diet advanced to low fiber/residue. Will continue to hold Benicar-HCT due to continued borderline hypotension.     Interval History: Pt seen and examined. Pt currently c/o left posterior pelvic pain. Pt reports she just received PRN pain medication for it and "the pain is getting better."     Review of Systems   Constitutional: Positive for activity change. Negative for chills and fever.   HENT: Negative.    Eyes: Negative.    Respiratory: Negative for apnea, cough, choking, chest tightness, shortness of breath, wheezing and stridor.    Cardiovascular: Negative for chest pain, palpitations and leg swelling.   Gastrointestinal: Negative for abdominal pain, constipation, diarrhea, nausea and vomiting.   Endocrine: Negative.    Genitourinary: Negative.    Musculoskeletal: Negative for arthralgias, back pain, gait problem, myalgias, neck pain and neck stiffness.   Skin: Negative.    Allergic/Immunologic: Negative.    Neurological: Negative for dizziness, tremors, seizures, syncope, facial asymmetry, speech difficulty, weakness, light-headedness, numbness and headaches.   Hematological: Negative.    Psychiatric/Behavioral: Negative.      Objective:     Vital Signs (Most Recent):  Temp: 98.3 °F (36.8 °C) (09/09/18 1159)  Pulse: 75 (09/09/18 1159)  Resp: 20 (09/09/18 1159)  BP: 131/71 (09/09/18 1159)  SpO2: 97 % (09/09/18 1159) Vital Signs (24h Range):  Temp:  [97.8 °F (36.6 °C)-100.1 °F (37.8 °C)] 98.3 °F (36.8 °C)  Pulse:  [] 75  Resp:  [16-20] 20  SpO2:  [96 %-100 %] 97 %  BP: (111-164)/(55-79) 131/71     Weight: 118 kg (260 lb 2.6 " oz)  Body mass index is 44.66 kg/m².    Intake/Output Summary (Last 24 hours) at 9/9/2018 1256  Last data filed at 9/9/2018 0853  Gross per 24 hour   Intake 3551.67 ml   Output 35 ml   Net 3516.67 ml      Physical Exam   Constitutional: She is oriented to person, place, and time. She appears well-developed and well-nourished. She is cooperative. She is easily aroused. No distress.   HENT:   Head: Normocephalic and atraumatic.   Eyes: EOM are normal.   Neck: Normal range of motion. Neck supple.   Cardiovascular: Normal rate, regular rhythm, normal heart sounds and intact distal pulses.   No murmur heard.  Pulmonary/Chest: Effort normal. No stridor. No respiratory distress. She has decreased breath sounds in the right upper field, the right middle field, the right lower field, the left upper field and the left lower field. She has no wheezes. She has no rales. She exhibits no tenderness.   Abdominal: Soft. Bowel sounds are normal. She exhibits no distension. There is no tenderness. There is no rebound and no guarding.   Genitourinary:   Genitourinary Comments: Deferred   Musculoskeletal: She exhibits no edema, tenderness or deformity.   Neurological: She is alert, oriented to person, place, and time and easily aroused. No sensory deficit.   Skin: Skin is warm and dry. Capillary refill takes less than 2 seconds.        Psychiatric: She has a normal mood and affect. Her behavior is normal. Judgment and thought content normal.   Nursing note and vitals reviewed.      Significant Labs:   CBC:   Recent Labs   Lab  09/08/18   0528  09/09/18   0841   WBC  14.46*  13.65*   HGB  8.3*  7.5*   HCT  26.7*  24.2*   PLT  538*  515*       Significant Imaging:   Imaging Results          CT Guided Abscess Drainage With Catheter (Final result)  Result time 09/07/18 16:23:47    Final result by Andres Menchaca MD (09/07/18 16:23:47)                 Impression:      CT guided abscess strain as above.  Follow-up cultures    All CT scans  at this facility use dose modulation, iterative reconstruction, and/or weight based dosing when appropriate to reduce radiation dose to as low as reasonable achievable.      Electronically signed by: Andres Menchaca MD  Date:    09/07/2018  Time:    16:23             Narrative:    EXAMINATION:  CT GUIDED ABSCESS DRAINAGE WITH CATHETER    CLINICAL HISTORY:  Intra abdominal abscess;    TECHNIQUE:  Medications:    Moderate sedation using versed and fentanyl was provided with and trained observer monitoring the patient's vital signs and level of consciousness. The total time of sedation was 30 min.    1% lidocaine locally    :YOVANI Menchaca    Complications: None    COMPARISON:  09/07/2018    FINDINGS:  Procedure: The risks and benefits of this procedure were discussed with the patient, written informed consent was obtained.  The patient was placed supine on the CT gantry.  Preprocedural imaging revealed cul de sac fluid collection.  A suitable skin site for drainage was selected.  IV fentanyl and Versed was given for conscious sedation.  The skin site was prepped and draped in sterile fashion.  The skin was anesthetized with 1% lidocaine.    Using CT guidance a 19-gauge introducer needle was guided into the collection via a posterior approach with the patient in prone position.  Prior to wire placement appropriate needle positioning was confirmed with CT.   Wire was advanced within the collection.  Images demonstrated appropriate positioning.  Tract was dilated to 8 Montserratian and an 8.5 Montserratian APD catheter was placed within the collection.  There is return of approximately 30 cc of clarissa pus.  Catheter was secured at the skin surface..    Postprocedural imaging was acquired. The site was bandaged sterilely. The patient left the room in stable condition.    Findings:    1.  Preprocedural CT showed cul de sac fluid collection with thick wall.    2.  CT-guided drainage    3.  Post procedural CT showed no complication and  appropriate positioning of the drainage catheter.                               CT Renal Stone Study Abd Pelvis WO (Final result)  Result time 09/07/18 10:51:06    Final result by DIMPLE Greer Sr., MD (09/07/18 10:51:06)                 Impression:      1. There is a poorly visualized fluid collection between the uterus and the rectum. This fluid collection measures approximately 4 cm in craniocaudal dimension. There is no gas visualized within this fluid collection. In the mesenteric portion of the inferior aspect of the abdomen and in the pelvis  there are several fluid and gas collections that have moderate adjacent inflammatory changes. The larger 1 in this area measures 4 cm.  These findings are worrisome for abscesses.  The examination is limited secondary to the lack of oral and IV contrast.  2. There is a mild amount of alveolar consolidation scattered throughout the left lower lobe.  This is characteristic of pneumonia.  3. There is a 3 mm nonobstructive stone in the inferior pole of the left kidney.  4. The liver is enlarged. It measures 22.2 cm in craniocaudal dimension.  5. There is a 3 mm nonobstructive stone in the inferior pole of the left kidney.  6. The uterus and ovaries were not well seen.  7. There is a moderate size umbilical hernia. The width of the mouth of this hernia measures 51 mm. A segment of the transverse portion of the colon is in the region of the mouth of this hernia.  8. The above findings and impressions were discussed with Dr. Costello via the telephone at 10:40 on 09/07/2018.  All CT scans at this facility use dose modulation, iterative reconstruction, and/or weight base dosing when appropriate to reduce radiation dose when appropriate to reduce radiation dose to as low as reasonably achievable.      Electronically signed by: Raghavendra Greer MD  Date:    09/07/2018  Time:    10:51             Narrative:    EXAMINATION:  CT RENAL STONE STUDY ABD PELVIS WO    CLINICAL  HISTORY:  Abdominal pain, unspecified;    TECHNIQUE:  Standard abdomen and pelvis CT protocol without oral or IV contrast was performed.    FINDINGS:  Finding: The size of the heart is within normal limits.  There is a mild amount of alveolar consolidation scattered throughout the left lower lobe.  The visualized portion of the right lung is clear.  There is no pneumothorax or pleural effusion.    The liver is enlarged.  It measures 22.2 cm in craniocaudal dimension.  The gallbladder, pancreas, spleen, adrenals, and right kidney are normal in appearance.  There is a 3 mm nonobstructive stone in the inferior pole of the left kidney.  The ureters and the urinary bladder are normal in appearance. The appendix is not visualized.  There is a poorly visualized fluid collection between the uterus and the rectum.  This fluid collection measures approximately 4 cm in craniocaudal dimension.  There is no gas visualized within this fluid collection.  In the mesenteric portion of the inferior aspect of the abdomen and in the pelvis there are several fluid and gas collections that have moderate adjacent inflammatory changes.  The larger 1 in this area measures 4 cm.  There is no free fluid within the abdomen or pelvis. There is no pneumoperitoneum.  The uterus and ovaries were not well seen.  There is a moderate size umbilical hernia.  The width of the mouth of this hernia measures 51 mm.  A segment of the transverse portion of the colon is in the region of the mouth of this hernia.                              Assessment/Plan:      * Diverticulitis of colon with perforation    - General surgery primary -- management per primary team  - Continue IV Zosyn  - May require surgery if condition worsens or fails to improve  - Diet advanced to low fiber/residue today, monitor  - Analgesics prn          Pelvic abscess in female    - IR consulted by General surgery  - 8.3 Estonian drainage catheter placed on 09/07/18. Removed 45 ccs of  purulent fluid   - Left buttock cultures -- (+) Streptococcus anginosus and gram negative poncho, lactose . Susceptibilities pending. May need to consult ID for antibiotic recommendations upon discharge (susceptibilities not normally ran on Streptococcus anginosis)   - Continue IV Zosyn              Hypokalemia    - K+ 3.2 on 09/08/18 -- repleted with KCL 30 mEq PO x 1   - Currently receiving continues IVF with KCL 20 mEq  - Repeat BMP pending  - Telemetry monitoring  - Will continue to monitor and replete as needed        Pneumonia due to infectious organism    - CT renal stone study upon admit showed alveolar consolidation of left lower lobe characteristic of pneumonia  - CXR on 09/08/18 showed new infiltrates or discoid atelectasis in the right lower lung.  Small ground-glass infiltrates in left lung base seen on prior CT are not visualized radiographically  - Blood cultures with NGTD  - Sputum culture pending  - Continue supplemental oxygen prn, keep O2 sats > 92%  - Continue IS and PRN inhaled medications   - Pt currently receiving IV Zosyn, will continue            Essential hypertension    - Due to borderline hypotension, will continue to hold Benicar-HCT at this time  - Will monitor BP trends and restart medication once BP allows  - If remains hypotensive upon discharge, continue to hold BP medication and F/U with PCP            VTE Risk Mitigation (From admission, onward)        Ordered     enoxaparin injection 40 mg  Daily      09/07/18 1228     IP VTE HIGH RISK PATIENT  Once      09/07/18 1228              LYNDON James  Department of Hospital Medicine   [facility]

## 2018-09-09 NOTE — PLAN OF CARE
Problem: Patient Care Overview  Goal: Plan of Care Review  Outcome: Ongoing (interventions implemented as appropriate)  Pt remains free from injury and falls. Fall precautions in place. Pt turns independently in bed. Tolerating low fiber/low residue diet. Denies nausea. PRN pain med given for pain. Pt's left posterior lumbar LAURA remains clean, dry, and intact, draining yellow pus. Meds given as ordered. Pt able to verbalize wants and needs. Bed in low, locked position, call light and personal items within reach of pt. POC discussed with pt. Verbalized understanding. VSS. Will continue to monitor.

## 2018-09-09 NOTE — ASSESSMENT & PLAN NOTE
- General surgery primary -- management per primary team  - Continue IV Zosyn  - May require surgery if condition worsens or fails to improve  - Diet advanced to low fiber/residue today, monitor  - Analgesics prn

## 2018-09-10 VITALS
SYSTOLIC BLOOD PRESSURE: 131 MMHG | DIASTOLIC BLOOD PRESSURE: 78 MMHG | RESPIRATION RATE: 20 BRPM | HEIGHT: 64 IN | BODY MASS INDEX: 44.42 KG/M2 | HEART RATE: 89 BPM | WEIGHT: 260.19 LBS | OXYGEN SATURATION: 97 % | TEMPERATURE: 99 F

## 2018-09-10 LAB
ANION GAP SERPL CALC-SCNC: 11 MMOL/L
BACTERIA SPEC AEROBE CULT: NORMAL
BACTERIA SPEC AEROBE CULT: NORMAL
BASOPHILS # BLD AUTO: 0.01 K/UL
BASOPHILS NFR BLD: 0.1 %
BUN SERPL-MCNC: 7 MG/DL
CALCIUM SERPL-MCNC: 8.5 MG/DL
CHLORIDE SERPL-SCNC: 100 MMOL/L
CO2 SERPL-SCNC: 30 MMOL/L
CREAT SERPL-MCNC: 0.7 MG/DL
DIFFERENTIAL METHOD: ABNORMAL
EOSINOPHIL # BLD AUTO: 0.5 K/UL
EOSINOPHIL NFR BLD: 4.5 %
ERYTHROCYTE [DISTWIDTH] IN BLOOD BY AUTOMATED COUNT: 17.3 %
EST. GFR  (AFRICAN AMERICAN): >60 ML/MIN/1.73 M^2
EST. GFR  (NON AFRICAN AMERICAN): >60 ML/MIN/1.73 M^2
GLUCOSE SERPL-MCNC: 91 MG/DL
HCT VFR BLD AUTO: 24.5 %
HGB BLD-MCNC: 7.7 G/DL
LYMPHOCYTES # BLD AUTO: 2 K/UL
LYMPHOCYTES NFR BLD: 19 %
MCH RBC QN AUTO: 24.5 PG
MCHC RBC AUTO-ENTMCNC: 31.4 G/DL
MCV RBC AUTO: 78 FL
MONOCYTES # BLD AUTO: 0.6 K/UL
MONOCYTES NFR BLD: 6 %
NEUTROPHILS # BLD AUTO: 7.4 K/UL
NEUTROPHILS NFR BLD: 70.4 %
PLATELET # BLD AUTO: 551 K/UL
PMV BLD AUTO: 8.6 FL
POTASSIUM SERPL-SCNC: 3.2 MMOL/L
RBC # BLD AUTO: 3.14 M/UL
SODIUM SERPL-SCNC: 141 MMOL/L
WBC # BLD AUTO: 10.58 K/UL

## 2018-09-10 PROCEDURE — G8978 MOBILITY CURRENT STATUS: HCPCS | Mod: CI

## 2018-09-10 PROCEDURE — G8979 MOBILITY GOAL STATUS: HCPCS | Mod: CH

## 2018-09-10 PROCEDURE — 63600175 PHARM REV CODE 636 W HCPCS: Performed by: SURGERY

## 2018-09-10 PROCEDURE — 94799 UNLISTED PULMONARY SVC/PX: CPT

## 2018-09-10 PROCEDURE — 97161 PT EVAL LOW COMPLEX 20 MIN: CPT

## 2018-09-10 PROCEDURE — 97110 THERAPEUTIC EXERCISES: CPT

## 2018-09-10 PROCEDURE — 80048 BASIC METABOLIC PNL TOTAL CA: CPT

## 2018-09-10 PROCEDURE — 36415 COLL VENOUS BLD VENIPUNCTURE: CPT

## 2018-09-10 PROCEDURE — 25000003 PHARM REV CODE 250: Performed by: SURGERY

## 2018-09-10 PROCEDURE — 85025 COMPLETE CBC W/AUTO DIFF WBC: CPT

## 2018-09-10 PROCEDURE — 99238 HOSP IP/OBS DSCHRG MGMT 30/<: CPT | Mod: ,,, | Performed by: PHYSICIAN ASSISTANT

## 2018-09-10 PROCEDURE — 99900035 HC TECH TIME PER 15 MIN (STAT)

## 2018-09-10 PROCEDURE — 97530 THERAPEUTIC ACTIVITIES: CPT

## 2018-09-10 RX ORDER — HYDROCODONE BITARTRATE AND ACETAMINOPHEN 5; 325 MG/1; MG/1
2 TABLET ORAL EVERY 4 HOURS PRN
Qty: 30 TABLET | Refills: 0 | Status: SHIPPED | OUTPATIENT
Start: 2018-09-10

## 2018-09-10 RX ORDER — AMOXICILLIN AND CLAVULANATE POTASSIUM 875; 125 MG/1; MG/1
1 TABLET, FILM COATED ORAL 2 TIMES DAILY
Qty: 20 TABLET | Refills: 0 | Status: SHIPPED | OUTPATIENT
Start: 2018-09-10 | End: 2018-09-20

## 2018-09-10 RX ORDER — POTASSIUM CHLORIDE 20 MEQ/1
40 TABLET, EXTENDED RELEASE ORAL ONCE
Status: COMPLETED | OUTPATIENT
Start: 2018-09-10 | End: 2018-09-10

## 2018-09-10 RX ADMIN — PIPERACILLIN AND TAZOBACTAM 4.5 G: 4; .5 INJECTION, POWDER, LYOPHILIZED, FOR SOLUTION INTRAVENOUS; PARENTERAL at 09:09

## 2018-09-10 RX ADMIN — DEXTROSE MONOHYDRATE, SODIUM CHLORIDE, AND POTASSIUM CHLORIDE: 50; 4.5; 1.49 INJECTION, SOLUTION INTRAVENOUS at 12:09

## 2018-09-10 RX ADMIN — POTASSIUM CHLORIDE 40 MEQ: 1500 TABLET, EXTENDED RELEASE ORAL at 09:09

## 2018-09-10 RX ADMIN — MORPHINE SULFATE 2 MG: 4 INJECTION INTRAVENOUS at 05:09

## 2018-09-10 RX ADMIN — PIPERACILLIN AND TAZOBACTAM 4.5 G: 4; .5 INJECTION, POWDER, LYOPHILIZED, FOR SOLUTION INTRAVENOUS; PARENTERAL at 12:09

## 2018-09-10 RX ADMIN — MORPHINE SULFATE 2 MG: 4 INJECTION INTRAVENOUS at 12:09

## 2018-09-10 RX ADMIN — MORPHINE SULFATE 2 MG: 4 INJECTION INTRAVENOUS at 01:09

## 2018-09-10 RX ADMIN — POLYETHYLENE GLYCOL 3350 17 G: 17 POWDER, FOR SOLUTION ORAL at 09:09

## 2018-09-10 NOTE — PT/OT/SLP EVAL
Physical Therapy Evaluation and Discharge Note    Patient Name:  Lulú Seth   MRN:  [id number]    Recommendations:     Discharge Recommendations:  home   Discharge Equipment Recommendations: none   Barriers to discharge: None    Assessment:     Lulú Seth is a 37 y.o. female admitted with a medical diagnosis of Diverticulitis of colon with perforation. .  At this time, patient is functioning at their prior level of function and does not require further acute PT services b/c patient amb in haa with sup only from PT; only mild report of felling a little unsteady but she demonstrated good awareness by using rail in cooper prn - defer to people movers for amb in halls    Recent Surgery: * No surgery found *      Plan:     During this hospitalization, patient does not require further acute PT services.  Please re-consult if situation changes.      Subjective     Chief Complaint: discomfort in buttocks  Patient/Family Comments/goals: to go home  Pain/Comfort:  · Pain Rating 1: 5/10  · Location - Side 1: Left  · Location 1: other (see comments)(buttocks - drain site)  · Pain Addressed 1: Reposition, Distraction  · Pain Rating Post-Intervention 1: 3/10    Patients cultural, spiritual, Catholic conflicts given the current situation:      Living Environment:  With fiance and dtr - 2 y/o  Prior to admission, patients level of function was indep.  Equipment used at home: none.  DME owned (not currently used): none.  Upon discharge, patient will have assistance from family.    Objective:     Communicated with BISHOP Herrera prior to session.  Patient found lying in bed on R side; fiance pesent upon PT entry to room found with: LAURA drain, peripheral IV     General Precautions: Standard, fall   Orthopedic Precautions:N/A   Braces: N/A     Exams:  · B LE rom wfl and strength grossly at least 4/5    Functional Mobility:  · All mobility is sup or less and no gross LOB    AM-PAC 6 CLICK MOBILITY  Total Score:         Therapeutic Activities and Exercises:   PT educated patient on safety precautions, fall prevention, body mechanics to dec pain/keep wt off L buttocks/drain, le exs to dec stiffness and rec to amb with nurse or fiance multiple times a day, in addition to people movers tech to inc activity    AM-PAC 6 CLICK MOBILITY  Total Score:      Patient left right sidelying with all lines intact, call button in reach and RN notified.    GOALS:   Multidisciplinary Problems     Physical Therapy Goals     Not on file                History:     Past Medical History:   Diagnosis Date    Hypertension        Past Surgical History:   Procedure Laterality Date     SECTION, CLASSIC      x1    DILATION AND CURETTAGE OF UTERUS         Clinical Decision Making:     History  Co-morbidities and personal factors that may impact the plan of care Examination  Body Structures and Functions, activity limitations and participation restrictions that may impact the plan of care Clinical Presentation   Decision Making/ Complexity Score   Co-morbidities:   [] Time since onset of injury / illness / exacerbation  [] Status of current condition  []Patient's cognitive status and safety concerns    [] Multiple Medical Problems (see med hx)  Personal Factors:   [] Patient's age  [] Prior Level of function   [] Patient's home situation (environment and family support)  [] Patient's level of motivation  [] Expected progression of patient      HISTORY:(criteria)    [] 44109 - no personal factors/history    [] 79669 - has 1-2 personal factor/comorbidity     [] 23685 - has >3 personal factor/comorbidity     Body Regions:  [] Objective examination findings  [] Head     []  Neck  [] Trunk   [] Upper Extremity  [] Lower Extremity    Body Systems:  [] For communication ability, affect, cognition, language, and learning style: the assessment of the ability to make needs known, consciousness, orientation (person, place, and time), expected emotional  /behavioral responses, and learning preferences (eg, learning barriers, education  needs)  [] For the neuromuscular system: a general assessment of gross coordinated movement (eg, balance, gait, locomotion, transfers, and transitions) and motor function  (motor control and motor learning)  [] For the musculoskeletal system: the assessment of gross symmetry, gross range of motion, gross strength, height, and weight  [] For the integumentary system: the assessment of pliability(texture), presence of scar formation, skin color, and skin integrity  [] For cardiovascular/pulmonary system: the assessment of heart rate, respiratory rate, blood pressure, and edema     Activity limitations:    [] Patient's cognitive status and saf ety concerns          [] Status of current condition      [] Weight bearing restriction  [] Cardiopulmunary Restriction    Participation Restrictions:   [] Goals and goal agreement with the patient     [] Rehab potential (prognosis) and probable outcome      Examination of Body System: (criteria)    [] 62524 - addressing 1-2 elements    [] 59882 - addressing a total of 3 or more elements     [] 55079 -  Addressing a total of 4 or more elements         Clinical Presentation: (criteria)  Choose one     On examination of body system using standardized tests and measures patient presents with (CHOOSE ONE) elements from any of the following: body structures and functions, activity limitations, and/or participation restrictions.  Leading to a clinical presentation that is considered (CHOOSE ONE)                              Clinical Decision Making  (Eval Complexity):  Choose One     Time Tracking:     PT Received On: 09/07/18  PT Start Time: 1550     PT Stop Time: 1630  PT Total Time (min): 40 min     Billable Minutes: Evaluation 20, Therapeutic Activity 10 and Therapeutic Exercise 10      Dawood Spence PT  09/10/2018

## 2018-09-10 NOTE — PLAN OF CARE
Problem: Patient Care Overview  Goal: Plan of Care Review  Outcome: Ongoing (interventions implemented as appropriate)  Pt had no adverse events during shift. NSR on tele monitor. VSS. IVF/abx administered as ordered. Pain well controlled w/ IV pain meds. Dressing maintained CDI, drain care performed. Output is yellow/pus. Chart reviewed, will continue to monitor.

## 2018-09-10 NOTE — PLAN OF CARE
Problem: Patient Care Overview  Goal: Plan of Care Review  Outcome: Ongoing (interventions implemented as appropriate)  Remained free from injury. Tolerating diet. Pain controlled with prn medication. Ambulating with assistance. To be discharged with acosta drain. Chart check complete.

## 2018-09-10 NOTE — PROGRESS NOTES
Discharge instructions given, verbalized understanding. Educated on acosta drain care. Waiting on ride.

## 2018-09-10 NOTE — PLAN OF CARE
Orders received for home health services.  CM met with patient at the bedside.  Patient does not have a preference and is agreeable to [facility] .  Choice form completed and placed in patient blue folder.  Referral made via [facility] care.

## 2018-09-10 NOTE — DISCHARGE INSTRUCTIONS
Discharge Instructions: Caring for Your Hai-Wills Drainage Tube  Your doctor discharges you with a Hai-Wills drainage tube. Doctors commonly leave this drain within the abdominal cavity after surgery. It helps drain and collect blood and body fluid after surgery. This can prevent swelling and reduces the risk for infection. The tube is held in place by a few stitches. It is covered with a bandage. Your doctor will remove the drain when he or she determines you no longer need it.  Home care  · Dont sleep on the same side as the tube.  · Secure the tube and bag inside your clothing with a safety pin. This helps keep the tube from being pulled out.  · Empty your drain at least twice a day. Empty it more often if the drain is full. Wash  and dry your hands before emptying the drain.  ¨ Lift the opening on the drain.  ¨ Drain the fluid into a measuring cup.  ¨ Record the amount of fluid each time you empty the drain. Include the date and time it was emptied. Share this information with your doctor on your next visit.  ¨ Squeeze the bulb with your hands until you hear air coming out of the bulb if your doctor has instructed you to do so (sometimes the bulb is used as a reservoir without suction). Check with your doctor about specific drain instructions.  ¨ Close the opening.  · Change the dressing around the tube every day.  ¨ Wash your hands.  ¨ Remove the old bandage.  ¨ Wash your hands again.  ¨ Wet a cotton swab and clean the skin around the incision and tube site. Use normal saline solution (salt and water). Or, you can use warm, soapy water.  ¨ Put a new bandage on the incision and tube site. Make the bandage large enough to cover the whole incision area.  ¨ Tape the bandage in place.  · Keep the bandage and tube site dry when you shower. Ask your healthcare provider about the best way to do this.  · Stripping the tube helps keep blood clots from blocking the tube. Ask your nurse how often you should  strip the tube. Stripping may not be needed, depending on where and why your doctor placed the tube. It may even be dangerous in some cases.   ¨ Hold the tubing where it leaves the skin, with one hand. This keeps it from pulling on the skin.  ¨ Pinch the tubing with the thumb and first finger of your other hand.  ¨ Slowly and firmly pull your thumb and first finger down the tubing. You may find it helpful to hold an alcohol swab between your fingers and the tube to lubricate the tubing.  ¨ If the pulling hurts or feels like its coming out of the skin, stop. Begin again more gently.  Follow-up care  Make a follow-up appointment as directed by our staff.     When to seek medical care  Call your healthcare provider right away if you have any of the following:  · New or increased pain around the tube  · Redness, swelling, or warmth around the incision or tube  · Drainage that is foul-smelling  · Vomiting  · Fever of 100.4°F (38°C)  · Fluid leaking around the tube  · Incision seems not to be healing  · Stitches become loose  · Tube falls out or breaks  · Drainage that changes from light pink to dark red  · Blood clots in the drainage bulb  · A sudden increase or decrease in the amount of drainage (over 30 mL)   Date Last Reviewed: 2/1/2017 © 2000-2017 [employer]. [address] [place] [address], [place] [address]. All rights reserved. This information is not intended as a substitute for professional medical care. Always follow your healthcare professional's instructions.        Acetaminophen; Hydrocodone tablets or capsules  What is this medicine?  ACETAMINOPHEN; HYDROCODONE (a set a RADHA kiki fen; shelley droe KOE done) is a pain reliever. It is used to treat moderate to severe pain.  How should I use this medicine?  Take this medicine by mouth with a glass of water. Follow the directions on the prescription label. You can take it with or without food. If it upsets your stomach, take it with food. Do not take your  medicine more often than directed.  A special MedGuide will be given to you by the pharmacist with each prescription and refill. Be sure to read this information carefully each time.  Talk to your pediatrician regarding the use of this medicine in children. Special care may be needed.  What side effects may I notice from receiving this medicine?  Side effects that you should report to your doctor or health care professional as soon as possible:  · allergic reactions like skin rash, itching or hives, swelling of the face, lips, or tongue  · breathing problems  · confusion  · redness, blistering, peeling or loosening of the skin, including inside the mouth  · signs and symptoms of low blood pressure like dizziness; feeling faint or lightheaded, falls; unusually weak or tired  · trouble passing urine or change in the amount of urine  · yellowing of the eyes or skin  Side effects that usually do not require medical attention (report to your doctor or health care professional if they continue or are bothersome):  · constipation  · dry mouth  · nausea, vomiting  · tiredness  What may interact with this medicine?  This medicine may interact with the following medications:  · alcohol  · antiviral medicines for HIV or AIDS  · atropine  · antihistamines for allergy, cough and cold  · certain antibiotics like erythromycin, clarithromycin  · certain medicines for anxiety or sleep  · certain medicines for bladder problems like oxybutynin, tolterodine  · certain medicines for depression like amitriptyline, fluoxetine, sertraline  · certain medicines for fungal infections like ketoconazole and itraconazole  · certain medicines for Parkinson's disease like benztropine, trihexyphenidyl  · certain medicines for seizures like carbamazepine, phenobarbital, phenytoin, primidone  · certain medicines for stomach problems like dicyclomine, hyoscyamine  · certain medicines for travel sickness like scopolamine  · general anesthetics like  halothane, isoflurane, methoxyflurane, propofol  · ipratropium  · local anesthetics like lidocaine, pramoxine, tetracaine  · MAOIs like Carbex, Eldepryl, Marplan, Nardil, and Parnate  · medicines that relax muscles for surgery  · other medicines with acetaminophen  · other narcotic medicines for pain or cough  · phenothiazines like chlorpromazine, mesoridazine, prochlorperazine, thioridazine  · rifampin  What if I miss a dose?  If you miss a dose, take it as soon as you can. If it is almost time for your next dose, take only that dose. Do not take double or extra doses.  Where should I keep my medicine?  Keep out of the reach of children. This medicine can be abused. Keep your medicine in a safe place to protect it from theft. Do not share this medicine with anyone. Selling or giving away this medicine is dangerous and against the law.  This medicine may cause accidental overdose and death if it taken by other adults, children, or pets. Mix any unused medicine with a substance like cat litter or coffee grounds. Then throw the medicine away in a sealed container like a sealed bag or a coffee can with a lid. Do not use the medicine after the expiration date.  Store at room temperature between 15 and 30 degrees C (59 and 86 degrees F).  What should I tell my health care provider before I take this medicine?  They need to know if you have any of these conditions:  · brain tumor  · Crohn's disease, inflammatory bowel disease, or ulcerative colitis  · drug abuse or addiction  · head injury  · heart or circulation problems  · if you often drink alcohol  · kidney disease or problems going to the bathroom  · liver disease  · lung disease, asthma, or breathing problems  · an unusual or allergic reaction to acetaminophen, hydrocodone, other opioid analgesics, other medicines, foods, dyes, or preservatives  · pregnant or trying to get pregnant  · breast-feeding  What should I watch for while using this medicine?  Tell your  doctor or health care professional if your pain does not go away, if it gets worse, or if you have new or a different type of pain. You may develop tolerance to the medicine. Tolerance means that you will need a higher dose of the medicine for pain relief. Tolerance is normal and is expected if you take the medicine for a long time.  Do not suddenly stop taking your medicine because you may develop a severe reaction. Your body becomes used to the medicine. This does NOT mean you are addicted. Addiction is a behavior related to getting and using a drug for a non-medical reason. If you have pain, you have a medical reason to take pain medicine. Your doctor will tell you how much medicine to take. If your doctor wants you to stop the medicine, the dose will be slowly lowered over time to avoid any side effects.  There are different types of narcotic medicines (opiates). If you take more than one type at the same time or if you are taking another medicine that also causes drowsiness, you may have more side effects. Give your health care provider a list of all medicines you use. Your doctor will tell you how much medicine to take. Do not take more medicine than directed. Call emergency for help if you have problems breathing or unusual sleepiness.  Do not take other medicines that contain acetaminophen with this medicine. Always read labels carefully. If you have questions, ask your doctor or pharmacist.  If you take too much acetaminophen get medical help right away. Too much acetaminophen can be very dangerous and cause liver damage. Even if you do not have symptoms, it is important to get help right away.  You may get drowsy or dizzy. Do not drive, use machinery, or do anything that needs mental alertness until you know how this medicine affects you. Do not stand or sit up quickly, especially if you are an older patient. This reduces the risk of dizzy or fainting spells. Alcohol may interfere with the effect of this  medicine. Avoid alcoholic drinks.  The medicine will cause constipation. Try to have a bowel movement at least every 2 to 3 days. If you do not have a bowel movement for 3 days, call your doctor or health care professional.  Your mouth may get dry. Chewing sugarless gum or sucking hard candy, and drinking plenty of water may help. Contact your doctor if the problem does not go away or is severe.  NOTE:This sheet is a summary. It may not cover all possible information. If you have questions about this medicine, talk to your doctor, pharmacist, or health care provider. Copyright© 2017 [employer]        Amoxicillin; Clavulanic Acid tablets  What is this medicine?  AMOXICILLIN; CLAVULANIC ACID (a mox i BARRETT in; TEOFILO dumont ic AS id) is a penicillin antibiotic. It is used to treat certain kinds of bacterial infections. It will not work for colds, flu, or other viral infections.  How should I use this medicine?  Take this medicine by mouth with a full glass of water. Follow the directions on the prescription label. Take at the start of a meal. Do not crush or chew. If the tablet has a score line, you may cut it in half at the score line for easier swallowing. Take your medicine at regular intervals. Do not take your medicine more often than directed. Take all of your medicine as directed even if you think you are better. Do not skip doses or stop your medicine early.  Talk to your pediatrician regarding the use of this medicine in children. Special care may be needed.  What side effects may I notice from receiving this medicine?  Side effects that you should report to your doctor or health care professional as soon as possible:  · allergic reactions like skin rash, itching or hives, swelling of the face, lips, or tongue  · breathing problems  · dark urine  · fever or chills, sore throat  · redness, blistering, peeling or loosening of the skin, including inside the mouth  · seizures  · trouble passing urine or change in  the amount of urine  · unusual bleeding, bruising  · unusually weak or tired  · white patches or sores in the mouth or throat  Side effects that usually do not require medical attention (report to your doctor or health care professional if they continue or are bothersome):  · diarrhea  · dizziness  · headache  · nausea, vomiting  · stomach upset  · vaginal or anal irritation  What may interact with this medicine?  · allopurinol  · anticoagulants  · birth control pills  · methotrexate  · probenecid  What if I miss a dose?  If you miss a dose, take it as soon as you can. If it is almost time for your next dose, take only that dose. Do not take double or extra doses.  Where should I keep my medicine?  Keep out of the reach of children.  Store at room temperature below 25 degrees C (77 degrees F). Keep container tightly closed. Throw away any unused medicine after the expiration date.  What should I tell my health care provider before I take this medicine?  They need to know if you have any of these conditions:  · bowel disease, like colitis  · kidney disease  · liver disease  · mononucleosis  · an unusual or allergic reaction to amoxicillin, penicillin, cephalosporin, other antibiotics, clavulanic acid, other medicines, foods, dyes, or preservatives  · pregnant or trying to get pregnant  · breast-feeding  What should I watch for while using this medicine?  Tell your doctor or health care professional if your symptoms do not improve.  Do not treat diarrhea with over the counter products. Contact your doctor if you have diarrhea that lasts more than 2 days or if it is severe and watery.  If you have diabetes, you may get a false-positive result for sugar in your urine. Check with your doctor or health care professional.  Birth control pills may not work properly while you are taking this medicine. Talk to your doctor about using an extra method of birth control.  NOTE:This sheet is a summary. It may not cover all  possible information. If you have questions about this medicine, talk to your doctor, pharmacist, or health care provider. Copyright© 2017 [employer]

## 2018-09-10 NOTE — PLAN OF CARE
Sep17 Hospital Follow Up with Daniel Romeo MD   Monday Sep 17, 2018 12:40 PM  [facility] - General Surgery   [address]  [place] [address]   [phone]         09/10/18 1441   Final Note   Assessment Type Final Discharge Note   Discharge Disposition Home-Health   Hospital Follow Up  Appt(s) scheduled? Yes   Right Care Referral Info   Post Acute Recommendation Home-care   Facility Name [facility]

## 2018-09-11 ENCOUNTER — TELEPHONE (OUTPATIENT)
Dept: SURGERY | Facility: CLINIC | Age: 38
End: 2018-09-11

## 2018-09-11 NOTE — PHYSICIAN QUERY
PT Name: Lulú Seth  MR #: [id number]     Physician Query Form - Documentation Clarification      CDS/: Brandy E Capley               Contact information:  Spectralink:  [phone]    This form is a permanent document in the medical record.     Query Date: September 11, 2018    By submitting this query, we are merely seeking further clarification of documentation. Please utilize your independent clinical judgment when addressing the question(s) below.    The Medical record reflects the following:    Supporting Clinical Findings Location in Medical Record     Weight: 109.5 kg (241 lb 6.5 oz)  Body mass index is 41.44 kg/m².     Essential hypertension     H&P 9/7                                                                            Doctor, Please specify diagnosis or diagnoses associated with above clinical findings.    Provider Use Only       ( X )  Morbid obesity     (  )  Other (please specify):  ______________________                                                                                                               [  ] Clinically undetermined

## 2018-09-11 NOTE — TELEPHONE ENCOUNTER
----- Message from Cira Myers PA-C sent at 9/11/2018 10:59 AM CDT -----  Contact: [facility] Pharmacist   Please change to 1 every 4 hours  ----- Message -----  From: Sakhsi El MA  Sent: 9/10/2018   2:58 PM  To: Cira Myers PA-C        ----- Message -----  From: Magen Stokes  Sent: 9/10/2018   2:52 PM  To: Lucia GARNER Staff    Pharmacist is calling regarding requesting to make a change on HYDROcodone-acetaminophen (NORCO) 5-325 mg per tablet 30 tablet . Pharmacist states because of Guidelines by CDC that [facility] follows would like to change to 2 Tablets every 6 hours. ....      ..  [facility] [address] [place] [address] - [address]  [address] [facility] [address]  Phone: [phone] Fax: [phone]

## 2018-09-12 LAB
BACTERIA SPEC ANAEROBE CULT: NORMAL
BACTERIA SPEC ANAEROBE CULT: NORMAL

## 2018-09-13 LAB
BACTERIA BLD CULT: NORMAL
BACTERIA BLD CULT: NORMAL

## 2018-09-14 ENCOUNTER — TELEPHONE (OUTPATIENT)
Dept: SURGERY | Facility: CLINIC | Age: 38
End: 2018-09-14

## 2018-09-14 NOTE — TELEPHONE ENCOUNTER
----- Message from Gladys Beal sent at 9/14/2018  1:08 PM CDT -----  Contact: pt  Please give pt a call at ..[phone] (xpbs) regarding some questions she has about her paper work.

## 2018-09-14 NOTE — TELEPHONE ENCOUNTER
Aware that we have received paperwork and that Dr. Romeo will not be back in the office until Monday

## 2018-09-17 ENCOUNTER — OFFICE VISIT (OUTPATIENT)
Dept: SURGERY | Facility: CLINIC | Age: 38
End: 2018-09-17
Payer: COMMERCIAL

## 2018-09-17 VITALS
SYSTOLIC BLOOD PRESSURE: 116 MMHG | TEMPERATURE: 99 F | WEIGHT: 244.94 LBS | BODY MASS INDEX: 41.82 KG/M2 | HEART RATE: 88 BPM | DIASTOLIC BLOOD PRESSURE: 75 MMHG | HEIGHT: 64 IN

## 2018-09-17 DIAGNOSIS — N73.9 PELVIC ABSCESS IN FEMALE: ICD-10-CM

## 2018-09-17 DIAGNOSIS — K57.20 DIVERTICULITIS OF LARGE INTESTINE WITH PERFORATION WITHOUT BLEEDING: Primary | ICD-10-CM

## 2018-09-17 PROCEDURE — 99212 OFFICE O/P EST SF 10 MIN: CPT | Mod: S$GLB,,, | Performed by: SURGERY

## 2018-09-17 PROCEDURE — 99999 PR PBB SHADOW E&M-EST. PATIENT-LVL III: CPT | Mod: PBBFAC,,, | Performed by: SURGERY

## 2018-09-17 PROCEDURE — 3008F BODY MASS INDEX DOCD: CPT | Mod: CPTII,S$GLB,, | Performed by: SURGERY

## 2018-09-17 PROCEDURE — 3074F SYST BP LT 130 MM HG: CPT | Mod: CPTII,S$GLB,, | Performed by: SURGERY

## 2018-09-17 PROCEDURE — 3078F DIAST BP <80 MM HG: CPT | Mod: CPTII,S$GLB,, | Performed by: SURGERY

## 2018-09-17 NOTE — PROGRESS NOTES
History & Physical    SUBJECTIVE:     History of Present Illness:  Patient is a 37 y.o. female presents for diverticulitis as follow-up from hospital.  She was recently admitted to the hospital treated for perforated diverticulitis with antibiotics and IR drainage. She reports her drain has been putting out only minimal each day and is clear.  She is eating well and her bowel movements have been regular. She denies any fevers chills.    Chief Complaint   Patient presents with    Follow-up     Diverticulitis       Review of patient's allergies indicates:  No Known Allergies    Current Outpatient Medications   Medication Sig Dispense Refill    amoxicillin-clavulanate 875-125mg (AUGMENTIN) 875-125 mg per tablet Take 1 tablet by mouth 2 (two) times daily. for 10 days 20 tablet 0    HYDROcodone-acetaminophen (NORCO) 5-325 mg per tablet Take 2 tablets by mouth every 4 (four) hours as needed for Pain. 30 tablet 0    olmesartan-hydrochlorothiazide (BENICAR HCT) 40-25 mg per tablet Take 1 tablet by mouth once daily.      docusate sodium (COLACE) 100 MG capsule Take 1 capsule (100 mg total) by mouth 2 (two) times daily. 60 capsule 0     No current facility-administered medications for this visit.        Past Medical History:   Diagnosis Date    Hypertension      Past Surgical History:   Procedure Laterality Date     SECTION, CLASSIC      x1    DILATION AND CURETTAGE OF UTERUS       Family History   Problem Relation Age of Onset    Hypertension Unknown     Diabetes Unknown      Social History     Tobacco Use    Smoking status: Never Smoker    Smokeless tobacco: Never Used   Substance Use Topics    Alcohol use: Yes     Comment: occasion     Drug use: No        Review of Systems:  Review of Systems   Constitutional: Negative for chills, fatigue, fever and unexpected weight change.   Respiratory: Negative for cough, shortness of breath, wheezing and stridor.    Cardiovascular: Negative for chest pain,  "palpitations and leg swelling.   Gastrointestinal: Negative for abdominal distention, abdominal pain, constipation, diarrhea, nausea and vomiting.   Genitourinary: Negative for difficulty urinating, dysuria, frequency, hematuria and urgency.   Skin: Negative for color change, pallor, rash and wound.   Hematological: Does not bruise/bleed easily.       OBJECTIVE:     Vital Signs (Most Recent)  Temp: 99.1 °F (37.3 °C) (09/17/18 1248)  Pulse: 88 (09/17/18 1248)  BP: 116/75 (09/17/18 1248)  5' 4" (1.626 m)  111.1 kg (244 lb 14.9 oz)     Physical Exam:  Physical Exam   Constitutional: She is oriented to person, place, and time. She appears well-developed and well-nourished.   HENT:   Head: Normocephalic and atraumatic.   Eyes: EOM are normal.   Neck: Neck supple.   Cardiovascular: Normal rate and regular rhythm.   Pulmonary/Chest: Effort normal and breath sounds normal.   Abdominal: Soft. Bowel sounds are normal. She exhibits no distension. There is no tenderness.   LAURA drain serous output, removed today   Neurological: She is alert and oriented to person, place, and time.   Skin: Skin is warm and dry.   Vitals reviewed.        ASSESSMENT/PLAN:     37-year-old female with diverticulitis    PLAN:Plan     IR drain removed  Finished a course of antibiotics  Instructions on high-fiber diet given to patient  Follow-up in 1 month  Will need colonoscopy after 6 weeks       "

## 2018-09-18 ENCOUNTER — TELEPHONE (OUTPATIENT)
Dept: ADMINISTRATIVE | Facility: CLINIC | Age: 38
End: 2018-09-18

## 2018-09-18 NOTE — TELEPHONE ENCOUNTER
Home Health SOC 09/11/2018 - 11/09/2018 with [place] (Sima Gonzalez) - Dr. Daniel Romeo. Patient received SN services.

## 2018-09-19 ENCOUNTER — TELEPHONE (OUTPATIENT)
Dept: SURGERY | Facility: CLINIC | Age: 38
End: 2018-09-19

## 2018-09-19 NOTE — TELEPHONE ENCOUNTER
Nurse is aware there are no wound care orders and also is aware that home health can be discontinued due to no orders for wound care as that is what home health was called out for

## 2018-09-19 NOTE — TELEPHONE ENCOUNTER
----- Message from Daniel Romeo MD sent at 9/19/2018  9:48 AM CDT -----  Contact: Brunilda Goldman [facility]  She has no need for wound care as her LAURA drain was removed at her last clinic visit. Thanks  ----- Message -----  From: Grecia Delatorre MA  Sent: 9/19/2018   9:38 AM  To: Daniel Romeo MD    What are the wound care orders you would like for the patient?    ----- Message -----  From: Anisa Jon  Sent: 9/19/2018   9:23 AM  To: Ousmane Sanchez Staff    Please fax a updated wound care order to [phone],  is on her way to the  home now and can be reached at [phone]///thxMW

## 2018-09-20 ENCOUNTER — TELEPHONE (OUTPATIENT)
Dept: SURGERY | Facility: CLINIC | Age: 38
End: 2018-09-20

## 2018-09-20 NOTE — TELEPHONE ENCOUNTER
Patient is aware that there is no medical information for her to be off until 10/21/2018. We did not see her on 08/30/2018, the first time we saw her was 09/07/2018.

## 2018-09-20 NOTE — TELEPHONE ENCOUNTER
----- Message from Castro Bean sent at 9/20/2018  8:37 AM CDT -----  Contact: pt  She's calling in regards to her STD paperwork being filled incorrectly, pt this this information is needed today, [phone] (home)

## 2019-09-03 ENCOUNTER — HOSPITAL ENCOUNTER (EMERGENCY)
Facility: HOSPITAL | Age: 39
Discharge: HOME | End: 2019-09-03
Attending: EMERGENCY MEDICINE

## 2019-09-03 VITALS
WEIGHT: 207.69 LBS | OXYGEN SATURATION: 100 % | RESPIRATION RATE: 18 BRPM | SYSTOLIC BLOOD PRESSURE: 143 MMHG | DIASTOLIC BLOOD PRESSURE: 88 MMHG | BODY MASS INDEX: 35.46 KG/M2 | TEMPERATURE: 99 F | HEART RATE: 77 BPM | HEIGHT: 64 IN

## 2019-09-03 DIAGNOSIS — G47.00 INSOMNIA, UNSPECIFIED TYPE: Primary | ICD-10-CM

## 2019-09-03 DIAGNOSIS — R53.83 FATIGUE, UNSPECIFIED TYPE: ICD-10-CM

## 2019-09-03 DIAGNOSIS — D64.9 ANEMIA, UNSPECIFIED TYPE: ICD-10-CM

## 2019-09-03 LAB
ALBUMIN SERPL BCP-MCNC: 3.5 G/DL (ref 3.5–5.2)
ALP SERPL-CCNC: 96 U/L (ref 55–135)
ALT SERPL W/O P-5'-P-CCNC: 5 U/L (ref 10–44)
ANION GAP SERPL CALC-SCNC: 12 MMOL/L (ref 8–16)
AST SERPL-CCNC: 13 U/L (ref 10–40)
B-HCG UR QL: NEGATIVE
BASOPHILS # BLD AUTO: 0.02 K/UL (ref 0–0.2)
BASOPHILS NFR BLD: 0.2 % (ref 0–1.9)
BILIRUB SERPL-MCNC: 0.3 MG/DL (ref 0.1–1)
BUN SERPL-MCNC: 7 MG/DL (ref 6–20)
CALCIUM SERPL-MCNC: 9.7 MG/DL (ref 8.7–10.5)
CHLORIDE SERPL-SCNC: 101 MMOL/L (ref 95–110)
CO2 SERPL-SCNC: 25 MMOL/L (ref 23–29)
CREAT SERPL-MCNC: 0.8 MG/DL (ref 0.5–1.4)
DIFFERENTIAL METHOD: ABNORMAL
EOSINOPHIL # BLD AUTO: 0.2 K/UL (ref 0–0.5)
EOSINOPHIL NFR BLD: 1.9 % (ref 0–8)
ERYTHROCYTE [DISTWIDTH] IN BLOOD BY AUTOMATED COUNT: 20.9 % (ref 11.5–14.5)
EST. GFR  (AFRICAN AMERICAN): >60 ML/MIN/1.73 M^2
EST. GFR  (NON AFRICAN AMERICAN): >60 ML/MIN/1.73 M^2
GLUCOSE SERPL-MCNC: 91 MG/DL (ref 70–110)
HCT VFR BLD AUTO: 33.2 % (ref 37–48.5)
HGB BLD-MCNC: 10.5 G/DL (ref 12–16)
LYMPHOCYTES # BLD AUTO: 1.5 K/UL (ref 1–4.8)
LYMPHOCYTES NFR BLD: 15.5 % (ref 18–48)
MCH RBC QN AUTO: 23.6 PG (ref 27–31)
MCHC RBC AUTO-ENTMCNC: 31.6 G/DL (ref 32–36)
MCV RBC AUTO: 75 FL (ref 82–98)
MONOCYTES # BLD AUTO: 0.6 K/UL (ref 0.3–1)
MONOCYTES NFR BLD: 6.5 % (ref 4–15)
NEUTROPHILS # BLD AUTO: 7.2 K/UL (ref 1.8–7.7)
NEUTROPHILS NFR BLD: 76.5 % (ref 38–73)
PLATELET # BLD AUTO: 321 K/UL (ref 150–350)
PMV BLD AUTO: 9.2 FL (ref 9.2–12.9)
POTASSIUM SERPL-SCNC: 2.9 MMOL/L (ref 3.5–5.1)
PROT SERPL-MCNC: 8 G/DL (ref 6–8.4)
RBC # BLD AUTO: 4.45 M/UL (ref 4–5.4)
SODIUM SERPL-SCNC: 138 MMOL/L (ref 136–145)
TSH SERPL DL<=0.005 MIU/L-ACNC: 0.9 UIU/ML (ref 0.4–4)
WBC # BLD AUTO: 9.51 K/UL (ref 3.9–12.7)

## 2019-09-03 PROCEDURE — 84443 ASSAY THYROID STIM HORMONE: CPT

## 2019-09-03 PROCEDURE — 85025 COMPLETE CBC W/AUTO DIFF WBC: CPT

## 2019-09-03 PROCEDURE — 36000 PLACE NEEDLE IN VEIN: CPT

## 2019-09-03 PROCEDURE — 81025 URINE PREGNANCY TEST: CPT

## 2019-09-03 PROCEDURE — 25000003 PHARM REV CODE 250: Performed by: EMERGENCY MEDICINE

## 2019-09-03 PROCEDURE — 99283 EMERGENCY DEPT VISIT LOW MDM: CPT

## 2019-09-03 PROCEDURE — 80053 COMPREHEN METABOLIC PANEL: CPT

## 2019-09-03 RX ORDER — FERROUS SULFATE 325(65) MG
325 TABLET ORAL
COMMUNITY
Start: 2019-08-27 | End: 2020-02-23

## 2019-09-03 RX ORDER — POTASSIUM CHLORIDE 20 MEQ/15ML
20 SOLUTION ORAL
Status: COMPLETED | OUTPATIENT
Start: 2019-09-03 | End: 2019-09-03

## 2019-09-03 RX ADMIN — POTASSIUM CHLORIDE 20 MEQ: 20 SOLUTION ORAL at 07:09

## 2019-09-03 NOTE — ED PROVIDER NOTES
"38 year old female with complaint of weakness and dizziness X 3 weeks.  History of anemia and on iron.      SCRIBE #1 NOTE: I, Melba Centeno, am scribing for, and in the presence of, Lambert Vazquez MD. I have scribed the entire note.       History     Chief Complaint   Patient presents with    Fatigue     pt reports she has "been dealing with low iron and I feel weak and dizzy"     Review of patient's allergies indicates:  No Known Allergies      History of Present Illness     HPI    9/3/2019, 7:00 PM  History obtained from the patient      History of Present Illness: Lulú Seth is a 38 y.o. female patient with a PMHx of HTN who presents to the Emergency Department for evaluation of fatigue which onset gradually 2-3 weeks ago. Pt states she feels anxious. Symptoms are constant and moderate in severity.  No mitigating or exacerbating factors reported. Associated sxs include dizziness and sleep disturbance. She states she hasn't "slept in days." Patient denies any fever, chills, N/V/D, CP, SOB, extremity numbness/weakness, HA, lightheadedness, pre-syncope/syncope, abd pain, diaphoresis, increased stress, and all other sxs at this time. Pt reports she was evaluated by PCP and started on Wellbutrin 1 week ago. No further complaints or concerns at this time.         Arrival mode: Personal vehicle    PCP: Elijah Velarde MD        Past Medical History:  Past Medical History:   Diagnosis Date    Hypertension        Past Surgical History:  Past Surgical History:   Procedure Laterality Date     SECTION, CLASSIC      x1    DILATION AND CURETTAGE OF UTERUS           Family History:  Family History   Problem Relation Age of Onset    Hypertension Unknown     Diabetes Unknown        Social History:  Social History     Tobacco Use    Smoking status: Never Smoker    Smokeless tobacco: Never Used   Substance and Sexual Activity    Alcohol use: Yes     Comment: occasion     Drug use: No    Sexual " activity: Yes     Partners: Male        Review of Systems     Review of Systems   Constitutional: Positive for fatigue. Negative for chills, diaphoresis and fever.   HENT: Negative for sore throat.    Respiratory: Negative for cough and shortness of breath.    Cardiovascular: Negative for chest pain.   Gastrointestinal: Negative for abdominal pain, diarrhea, nausea and vomiting.   Genitourinary: Negative for dysuria.   Musculoskeletal: Negative for back pain.   Skin: Negative for rash.   Neurological: Positive for dizziness. Negative for syncope, weakness, light-headedness, numbness and headaches.   Hematological: Does not bruise/bleed easily.   Psychiatric/Behavioral: Positive for sleep disturbance. The patient is nervous/anxious.         - increased stress   All other systems reviewed and are negative.       Physical Exam     Initial Vitals [09/03/19 1625]   BP Pulse Resp Temp SpO2   (!) 165/93 90 18 98.3 °F (36.8 °C) 98 %      MAP       --          Physical Exam  Nursing Notes and Vital Signs Reviewed.  Constitutional: Patient is in no acute distress. Well-developed and well-nourished.  Head: Atraumatic. Normocephalic.  Eyes: PERRL. EOM intact. Conjunctivae are not pale. No scleral icterus.  ENT: Mucous membranes are moist. Oropharynx is clear and symmetric.    Neck: Supple. Full ROM. No lymphadenopathy.  Cardiovascular: Regular rate. Regular rhythm. No murmurs, rubs, or gallops. Distal pulses are 2+ and symmetric.  Pulmonary/Chest: No respiratory distress. Clear to auscultation bilaterally. No wheezing or rales.  Abdominal: Soft and non-distended.  There is no tenderness.  No rebound, guarding, or rigidity. Good bowel sounds.  Genitourinary: No CVA tenderness  Musculoskeletal: Moves all extremities. No obvious deformities. No edema. No calf tenderness.  Skin: Warm and dry.  Neurological:  Alert, awake, and appropriate.  Normal speech.  No acute focal neurological deficits are appreciated.  Psychiatric: Normal  "affect. Good eye contact. Appropriate in content.     ED Course   Procedures  ED Vital Signs:  Vitals:    09/03/19 1625 09/03/19 1827 09/03/19 1828 09/03/19 1829   BP: (!) 165/93 (!) 154/87 (!) 165/91 (!) 162/100   Pulse: 90 66 66 79   Resp: 18      Temp: 98.3 °F (36.8 °C)      TempSrc: Oral      SpO2: 98%      Weight: 94.2 kg (207 lb 10.8 oz)      Height: 5' 4" (1.626 m)       09/03/19 2003 09/03/19 2034   BP: (!) 141/89 (!) 143/88   Pulse: 76 77   Resp: 18 18   Temp: 98.5 °F (36.9 °C) 98.5 °F (36.9 °C)   TempSrc: Oral Oral   SpO2: 100% 100%   Weight:     Height:         Abnormal Lab Results:  Labs Reviewed   CBC W/ AUTO DIFFERENTIAL - Abnormal; Notable for the following components:       Result Value    Hemoglobin 10.5 (*)     Hematocrit 33.2 (*)     Mean Corpuscular Volume 75 (*)     Mean Corpuscular Hemoglobin 23.6 (*)     Mean Corpuscular Hemoglobin Conc 31.6 (*)     RDW 20.9 (*)     Gran% 76.5 (*)     Lymph% 15.5 (*)     All other components within normal limits   COMPREHENSIVE METABOLIC PANEL - Abnormal; Notable for the following components:    Potassium 2.9 (*)     ALT 5 (*)     All other components within normal limits   TSH   PREGNANCY TEST, URINE RAPID        All Lab Results:  Results for orders placed or performed during the hospital encounter of 09/03/19   CBC auto differential   Result Value Ref Range    WBC 9.51 3.90 - 12.70 K/uL    RBC 4.45 4.00 - 5.40 M/uL    Hemoglobin 10.5 (L) 12.0 - 16.0 g/dL    Hematocrit 33.2 (L) 37.0 - 48.5 %    Mean Corpuscular Volume 75 (L) 82 - 98 fL    Mean Corpuscular Hemoglobin 23.6 (L) 27.0 - 31.0 pg    Mean Corpuscular Hemoglobin Conc 31.6 (L) 32.0 - 36.0 g/dL    RDW 20.9 (H) 11.5 - 14.5 %    Platelets 321 150 - 350 K/uL    MPV 9.2 9.2 - 12.9 fL    Gran # (ANC) 7.2 1.8 - 7.7 K/uL    Lymph # 1.5 1.0 - 4.8 K/uL    Mono # 0.6 0.3 - 1.0 K/uL    Eos # 0.2 0.0 - 0.5 K/uL    Baso # 0.02 0.00 - 0.20 K/uL    Gran% 76.5 (H) 38.0 - 73.0 %    Lymph% 15.5 (L) 18.0 - 48.0 %    Mono% " 6.5 4.0 - 15.0 %    Eosinophil% 1.9 0.0 - 8.0 %    Basophil% 0.2 0.0 - 1.9 %    Differential Method Automated    Comprehensive metabolic panel   Result Value Ref Range    Sodium 138 136 - 145 mmol/L    Potassium 2.9 (L) 3.5 - 5.1 mmol/L    Chloride 101 95 - 110 mmol/L    CO2 25 23 - 29 mmol/L    Glucose 91 70 - 110 mg/dL    BUN, Bld 7 6 - 20 mg/dL    Creatinine 0.8 0.5 - 1.4 mg/dL    Calcium 9.7 8.7 - 10.5 mg/dL    Total Protein 8.0 6.0 - 8.4 g/dL    Albumin 3.5 3.5 - 5.2 g/dL    Total Bilirubin 0.3 0.1 - 1.0 mg/dL    Alkaline Phosphatase 96 55 - 135 U/L    AST 13 10 - 40 U/L    ALT 5 (L) 10 - 44 U/L    Anion Gap 12 8 - 16 mmol/L    eGFR if African American >60 >60 mL/min/1.73 m^2    eGFR if non African American >60 >60 mL/min/1.73 m^2   TSH   Result Value Ref Range    TSH 0.897 0.400 - 4.000 uIU/mL   Rapid Pregnancy, Urine   Result Value Ref Range    Preg Test, Ur Negative          Imaging Results:  Imaging Results    None               The Emergency Provider reviewed the vital signs and test results, which are outlined above.     ED Discussion            8:09 PM: Reassessed pt at this time. Discussed with pt all pertinent ED information and results. Discussed pt dx and plan of tx. Gave pt all f/u and return to the ED instructions. All questions and concerns were addressed at this time. Pt expresses understanding of information and instructions, and is comfortable with plan to discharge. Pt is stable for discharge.    I discussed with patient and/or family/caretaker that evaluation in the ED does not suggest any emergent or life threatening medical conditions requiring immediate intervention beyond what was provided in the ED, and I believe patient is safe for discharge.  Regardless, an unremarkable evaluation in the ED does not preclude the development or presence of a serious of life threatening condition. As such, patient was instructed to return immediately for any worsening or change in current  symptoms.        Medical Decision Making:   Clinical Tests:   Lab Tests: Ordered and Reviewed           ED Medication(s):  Medications   potassium chloride 10% oral solution 20 mEq (20 mEq Oral Given 9/3/19 1953)       Discharge Medication List as of 9/3/2019  8:05 PM          Follow-up Information     Elijah Velarde MD. Schedule an appointment as soon as possible for a visit in 2 days.    Specialty:  Pediatrics  Why:  For re-evaluation and further treatment  Contact information:  [address] [address]  [phone]             [facility]. Go today.    Specialty:  Emergency Medicine  Why:  If symptoms worsen, For re-evaluation and further treatment  Contact information:  [address]  [facility] [address] [phone]                     Scribe Attestation:   Scribe #1: I performed the above scribed service and the documentation accurately describes the services I performed. I attest to the accuracy of the note.     Attending:   Physician Attestation Statement for Scribe #1: I, Lambert Vazquez MD, personally performed the services described in this documentation, as scribed by Melba Centeno, in my presence, and it is both accurate and complete.           Clinical Impression       ICD-10-CM ICD-9-CM   1. Insomnia, unspecified type G47.00 780.52   2. Anemia, unspecified type D64.9 285.9   3. Fatigue, unspecified type R53.83 780.79       Disposition:   Disposition: Discharged  Condition: Stable         Lambert Vazquez MD  09/04/19 0134

## 2019-09-04 NOTE — ED NOTES
Level of Consciousness: The patient is awake, alert, and oriented with appropriate affect and speech; oriented to person, place and time. Pt states increased fatigue and weakness.   Appearance: Sitting up in bed with no acute distress noted. Clothing and hygiene are clean and worn appropriately.  Skin: Skin is warm and dry with good skin turgor; intact; color consistent with ethnicity.  Mucous membranes are moist.   Musculoskeletal: Moves all extremities well in full range of motion. No obvious deformities or swelling noted.  Respiratory: Airway open and patent, respirations spontaneous, even and unlabored. No accessory muscles in use.   Cardiac: Regular rate and rhythm, good pulses palpated peripherally, capillary refill < 3 seconds.  Abdomen: Soft, non-tender to palpation. No distention noted. Patient denies vaginal or rectal bleeding. States history of anemia and she has been taking her iron supplements.   Neurologic: PERRLA, face exhibits symmetrical expression, hand grasps equal and even bilaterally, normal sensation noted to all extremities and face when touched by a finger.  .

## 2019-09-05 ENCOUNTER — HOSPITAL ENCOUNTER (EMERGENCY)
Facility: HOSPITAL | Age: 39
Discharge: HOME | End: 2019-09-05
Attending: EMERGENCY MEDICINE
Payer: COMMERCIAL

## 2019-09-05 VITALS
OXYGEN SATURATION: 99 % | TEMPERATURE: 99 F | BODY MASS INDEX: 35.44 KG/M2 | HEIGHT: 64 IN | SYSTOLIC BLOOD PRESSURE: 142 MMHG | HEART RATE: 84 BPM | WEIGHT: 207.56 LBS | RESPIRATION RATE: 18 BRPM | DIASTOLIC BLOOD PRESSURE: 86 MMHG

## 2019-09-05 DIAGNOSIS — R53.1 WEAKNESS: ICD-10-CM

## 2019-09-05 DIAGNOSIS — G47.00 INSOMNIA, UNSPECIFIED TYPE: ICD-10-CM

## 2019-09-05 DIAGNOSIS — N39.0 URINARY TRACT INFECTION WITHOUT HEMATURIA, SITE UNSPECIFIED: Primary | ICD-10-CM

## 2019-09-05 DIAGNOSIS — D64.9 ANEMIA, UNSPECIFIED TYPE: ICD-10-CM

## 2019-09-05 LAB
ALBUMIN SERPL BCP-MCNC: 3.6 G/DL (ref 3.5–5.2)
ALP SERPL-CCNC: 93 U/L (ref 55–135)
ALT SERPL W/O P-5'-P-CCNC: 7 U/L (ref 10–44)
ANION GAP SERPL CALC-SCNC: 12 MMOL/L (ref 8–16)
AST SERPL-CCNC: 13 U/L (ref 10–40)
B-HCG UR QL: NEGATIVE
BACTERIA #/AREA URNS HPF: ABNORMAL /HPF
BASOPHILS # BLD AUTO: 0.02 K/UL (ref 0–0.2)
BASOPHILS NFR BLD: 0.2 % (ref 0–1.9)
BILIRUB SERPL-MCNC: 0.3 MG/DL (ref 0.1–1)
BILIRUB UR QL STRIP: NEGATIVE
BUN SERPL-MCNC: 7 MG/DL (ref 6–20)
CALCIUM SERPL-MCNC: 9.9 MG/DL (ref 8.7–10.5)
CHLORIDE SERPL-SCNC: 102 MMOL/L (ref 95–110)
CLARITY UR: CLEAR
CO2 SERPL-SCNC: 26 MMOL/L (ref 23–29)
COLOR UR: YELLOW
CREAT SERPL-MCNC: 0.8 MG/DL (ref 0.5–1.4)
DIFFERENTIAL METHOD: ABNORMAL
EOSINOPHIL # BLD AUTO: 0.2 K/UL (ref 0–0.5)
EOSINOPHIL NFR BLD: 1.7 % (ref 0–8)
ERYTHROCYTE [DISTWIDTH] IN BLOOD BY AUTOMATED COUNT: 21.2 % (ref 11.5–14.5)
EST. GFR  (AFRICAN AMERICAN): >60 ML/MIN/1.73 M^2
EST. GFR  (NON AFRICAN AMERICAN): >60 ML/MIN/1.73 M^2
GLUCOSE SERPL-MCNC: 99 MG/DL (ref 70–110)
GLUCOSE UR QL STRIP: NEGATIVE
HCT VFR BLD AUTO: 33.9 % (ref 37–48.5)
HGB BLD-MCNC: 10.7 G/DL (ref 12–16)
HGB UR QL STRIP: ABNORMAL
KETONES UR QL STRIP: NEGATIVE
LEUKOCYTE ESTERASE UR QL STRIP: ABNORMAL
LYMPHOCYTES # BLD AUTO: 1 K/UL (ref 1–4.8)
LYMPHOCYTES NFR BLD: 10.6 % (ref 18–48)
MCH RBC QN AUTO: 23.7 PG (ref 27–31)
MCHC RBC AUTO-ENTMCNC: 31.6 G/DL (ref 32–36)
MCV RBC AUTO: 75 FL (ref 82–98)
MICROSCOPIC COMMENT: ABNORMAL
MONOCYTES # BLD AUTO: 0.7 K/UL (ref 0.3–1)
MONOCYTES NFR BLD: 7.2 % (ref 4–15)
NEUTROPHILS # BLD AUTO: 7.5 K/UL (ref 1.8–7.7)
NEUTROPHILS NFR BLD: 80.5 % (ref 38–73)
NITRITE UR QL STRIP: NEGATIVE
PH UR STRIP: 6 [PH] (ref 5–8)
PLATELET # BLD AUTO: 300 K/UL (ref 150–350)
PMV BLD AUTO: 9 FL (ref 9.2–12.9)
POTASSIUM SERPL-SCNC: 2.9 MMOL/L (ref 3.5–5.1)
PROT SERPL-MCNC: 8.3 G/DL (ref 6–8.4)
PROT UR QL STRIP: NEGATIVE
RBC # BLD AUTO: 4.52 M/UL (ref 4–5.4)
RBC #/AREA URNS HPF: 2 /HPF (ref 0–4)
SODIUM SERPL-SCNC: 140 MMOL/L (ref 136–145)
SP GR UR STRIP: 1.01 (ref 1–1.03)
TROPONIN I SERPL DL<=0.01 NG/ML-MCNC: <0.006 NG/ML (ref 0–0.03)
URN SPEC COLLECT METH UR: ABNORMAL
UROBILINOGEN UR STRIP-ACNC: NEGATIVE EU/DL
WBC # BLD AUTO: 9.33 K/UL (ref 3.9–12.7)
WBC #/AREA URNS HPF: 5 /HPF (ref 0–5)

## 2019-09-05 PROCEDURE — 81025 URINE PREGNANCY TEST: CPT

## 2019-09-05 PROCEDURE — 36415 COLL VENOUS BLD VENIPUNCTURE: CPT

## 2019-09-05 PROCEDURE — 99283 EMERGENCY DEPT VISIT LOW MDM: CPT | Mod: 25

## 2019-09-05 PROCEDURE — 85025 COMPLETE CBC W/AUTO DIFF WBC: CPT

## 2019-09-05 PROCEDURE — 93005 ELECTROCARDIOGRAM TRACING: CPT

## 2019-09-05 PROCEDURE — 93010 ELECTROCARDIOGRAM REPORT: CPT | Mod: ,,, | Performed by: INTERNAL MEDICINE

## 2019-09-05 PROCEDURE — 93010 EKG 12-LEAD: ICD-10-PCS | Mod: ,,, | Performed by: INTERNAL MEDICINE

## 2019-09-05 PROCEDURE — 80053 COMPREHEN METABOLIC PANEL: CPT

## 2019-09-05 PROCEDURE — 84484 ASSAY OF TROPONIN QUANT: CPT

## 2019-09-05 PROCEDURE — 81000 URINALYSIS NONAUTO W/SCOPE: CPT

## 2019-09-05 RX ORDER — HYDROXYZINE PAMOATE 25 MG/1
25 CAPSULE ORAL 4 TIMES DAILY
Qty: 30 CAPSULE | Refills: 0 | Status: SHIPPED | OUTPATIENT
Start: 2019-09-05

## 2019-09-05 RX ORDER — NITROFURANTOIN 25; 75 MG/1; MG/1
100 CAPSULE ORAL 2 TIMES DAILY
Qty: 14 CAPSULE | Refills: 0 | Status: SHIPPED | OUTPATIENT
Start: 2019-09-05 | End: 2019-09-12

## 2019-09-05 NOTE — ED PROVIDER NOTES
Encounter Date: 2019       History     Chief Complaint   Patient presents with    Anemia     was here a couple of days ago; was told she was anemic, still feels the same     Pt c/o generalized weakness x several days.    The history is provided by the patient.   General Illness    The current episode started several days ago. The problem occurs rarely. Nothing relieves the symptoms. Nothing aggravates the symptoms. Pertinent negatives include no fever, no decreased vision, no double vision, no eye itching, no photophobia, no abdominal pain, no diarrhea, no nausea, no vomiting, no congestion, no ear discharge, no ear pain, no headaches, no hearing loss, no mouth sores, no rhinorrhea, no sore throat, no stridor, no swollen glands, no cough, no shortness of breath, no rash, no discharge, no pain and no eye redness.     Review of patient's allergies indicates:  No Known Allergies  Past Medical History:   Diagnosis Date    Hypertension      Past Surgical History:   Procedure Laterality Date     SECTION, CLASSIC      x1    DILATION AND CURETTAGE OF UTERUS       Family History   Problem Relation Age of Onset    Hypertension Unknown     Diabetes Unknown      Social History     Tobacco Use    Smoking status: Never Smoker    Smokeless tobacco: Never Used   Substance Use Topics    Alcohol use: Yes     Comment: occasion     Drug use: No     Review of Systems   Constitutional: Negative for chills and fever.   HENT: Negative for congestion, ear discharge, ear pain, hearing loss, mouth sores, rhinorrhea and sore throat.    Eyes: Negative for double vision, photophobia, pain, discharge, redness and itching.   Respiratory: Negative for cough, shortness of breath and stridor.    Cardiovascular: Negative for chest pain.   Gastrointestinal: Negative for abdominal pain, diarrhea, nausea and vomiting.   Endocrine: Negative for polydipsia and polyphagia.   Genitourinary: Negative for dysuria.   Musculoskeletal:  Negative for arthralgias, back pain and myalgias.   Skin: Negative for rash.   Neurological: Negative for weakness and headaches.   Hematological: Does not bruise/bleed easily.   Psychiatric/Behavioral: The patient is not nervous/anxious.    All other systems reviewed and are negative.      Physical Exam     Initial Vitals [09/05/19 0854]   BP Pulse Resp Temp SpO2   (!) 145/90 90 18 99.1 °F (37.3 °C) 97 %      MAP       --         Physical Exam    Nursing note and vitals reviewed.  Constitutional: Vital signs are normal. She appears well-developed and well-nourished. No distress.   HENT:   Head: Normocephalic and atraumatic.   Right Ear: External ear normal.   Left Ear: External ear normal.   Nose: Nose normal.   Mouth/Throat: Oropharynx is clear and moist.   Eyes: Conjunctivae, EOM and lids are normal. Pupils are equal, round, and reactive to light.   Neck: Normal range of motion and full passive range of motion without pain. Neck supple.   Cardiovascular: Normal rate, regular rhythm, S1 normal, S2 normal, normal heart sounds, intact distal pulses and normal pulses.   Pulmonary/Chest: Breath sounds normal. No respiratory distress. She has no wheezes. She has no rales.   Abdominal: Soft. Normal appearance and bowel sounds are normal. She exhibits no distension. There is no tenderness.   Musculoskeletal: Normal range of motion.   Lymphadenopathy:     She has no cervical adenopathy.   Neurological: She is alert and oriented to person, place, and time. She has normal strength. No cranial nerve deficit or sensory deficit. Coordination and gait normal.   Skin: Skin is warm, dry and intact.   Psychiatric: She has a normal mood and affect. Her speech is normal and behavior is normal. Judgment and thought content normal. Cognition and memory are normal.         ED Course   Procedures  Labs Reviewed   CBC W/ AUTO DIFFERENTIAL - Abnormal; Notable for the following components:       Result Value    Hemoglobin 10.7 (*)      Hematocrit 33.9 (*)     Mean Corpuscular Volume 75 (*)     Mean Corpuscular Hemoglobin 23.7 (*)     Mean Corpuscular Hemoglobin Conc 31.6 (*)     RDW 21.2 (*)     MPV 9.0 (*)     Gran% 80.5 (*)     Lymph% 10.6 (*)     All other components within normal limits   COMPREHENSIVE METABOLIC PANEL - Abnormal; Notable for the following components:    Potassium 2.9 (*)     ALT 7 (*)     All other components within normal limits   URINALYSIS, REFLEX TO URINE CULTURE - Abnormal; Notable for the following components:    Occult Blood UA Trace (*)     Leukocytes, UA Trace (*)     All other components within normal limits    Narrative:     Preferred Collection Type->Urine, Clean Catch   URINALYSIS MICROSCOPIC - Abnormal; Notable for the following components:    Bacteria Few (*)     All other components within normal limits    Narrative:     Preferred Collection Type->Urine, Clean Catch   PREGNANCY TEST, URINE RAPID   TROPONIN I          Imaging Results    None                               Clinical Impression:       ICD-10-CM ICD-9-CM   1. Urinary tract infection without hematuria, site unspecified N39.0 599.0   2. Weakness R53.1 780.79   3. Anemia, unspecified type D64.9 285.9         Disposition:   Disposition: Discharged  Condition: Stable                        GLORIA Lyn  09/05/19 1039       GLORIA Lyn  09/05/19 1045

## 2019-09-10 ENCOUNTER — HOSPITAL ENCOUNTER (EMERGENCY)
Facility: HOSPITAL | Age: 39
Discharge: HOME | End: 2019-09-10
Attending: EMERGENCY MEDICINE
Payer: COMMERCIAL

## 2019-09-10 VITALS
RESPIRATION RATE: 16 BRPM | HEART RATE: 94 BPM | BODY MASS INDEX: 35.84 KG/M2 | WEIGHT: 208.75 LBS | SYSTOLIC BLOOD PRESSURE: 171 MMHG | DIASTOLIC BLOOD PRESSURE: 88 MMHG | TEMPERATURE: 98 F | OXYGEN SATURATION: 100 %

## 2019-09-10 DIAGNOSIS — F41.9 ANXIETY: Primary | ICD-10-CM

## 2019-09-10 PROCEDURE — 99283 EMERGENCY DEPT VISIT LOW MDM: CPT

## 2019-09-10 NOTE — ED PROVIDER NOTES
"Encounter Date: 9/10/2019       History     Chief Complaint   Patient presents with    General Illness     pt c/o "my upper body just feels hot and my feet get hot sometimes too" - pt reports being here 3 times in a week for the same symptoms.     The history is provided by the patient.   The patient reports multiple episodes of feeling hot in arms and legs and has been seen here 3 times over the last 2 weeks. Pt denies any SOB, CP, NVD or any other symptoms at this time. Pt has appt with PCP at 3:00 PM today     Review of patient's allergies indicates:  No Known Allergies  Past Medical History:   Diagnosis Date    Hypertension      Past Surgical History:   Procedure Laterality Date     SECTION, CLASSIC      x1    DILATION AND CURETTAGE OF UTERUS       Family History   Problem Relation Age of Onset    Hypertension Unknown     Diabetes Unknown      Social History     Tobacco Use    Smoking status: Never Smoker    Smokeless tobacco: Never Used   Substance Use Topics    Alcohol use: Yes     Comment: occasion     Drug use: No     Review of Systems   Constitutional: Negative for fever.        + Feeling hot in arms and legs    HENT: Negative for sore throat.    Respiratory: Negative for shortness of breath.    Cardiovascular: Negative for chest pain.   Gastrointestinal: Negative for nausea.   Genitourinary: Negative for dysuria.   Musculoskeletal: Negative for back pain.   Skin: Negative for rash.   Neurological: Negative for weakness.   Hematological: Does not bruise/bleed easily.   All other systems reviewed and are negative.      Physical Exam     Initial Vitals [09/10/19 1139]   BP Pulse Resp Temp SpO2   (!) 171/88 94 16 98.2 °F (36.8 °C) 100 %      MAP       --         Physical Exam    Constitutional: She appears well-developed and well-nourished. She is not diaphoretic. No distress.   HENT:   Head: Normocephalic and atraumatic.   Eyes: Conjunctivae and EOM are normal. Pupils are equal, round, and " reactive to light.   Neck: Normal range of motion. Neck supple.   Cardiovascular: Normal rate, regular rhythm and normal heart sounds.   No murmur heard.  Pulmonary/Chest: Breath sounds normal. No respiratory distress. She has no wheezes. She has no rales.   Abdominal: Soft. Bowel sounds are normal. There is no tenderness. There is no rebound and no guarding.   Musculoskeletal: Normal range of motion. She exhibits no edema or tenderness.   Neurological: She is alert and oriented to person, place, and time. No cranial nerve deficit. GCS score is 15. GCS eye subscore is 4. GCS verbal subscore is 5. GCS motor subscore is 6.   Skin: Skin is warm and dry. Capillary refill takes less than 2 seconds.   Psychiatric: She has a normal mood and affect. Thought content normal.         ED Course   Procedures  Labs Reviewed - No data to display       Imaging Results    None             Additional MDM:   Comments: Pt has appointment with PCP later today. I discussed importance of keeping appt and RTED if symptoms worsen. She is currently being treated with hydroxyzine for anxiety. She reports increased stress related to health.  agrees she is more nervous lately. Pt will follow up with PCP later today .                    Clinical Impression:       ICD-10-CM ICD-9-CM   1. Anxiety F41.9 300.00                                Hermelindo Wallace Jr., FNP  09/10/19 1242

## 2020-12-16 NOTE — DISCHARGE SUMMARY
[facility] -   General Surgery  Discharge Summary      Patient Name: Lulú Seth  MRN: [id number]  Admission Date: 9/7/2018  Hospital Length of Stay: 3 days  Discharge Date and Time:  09/10/2018 2:16 PM  Attending Physician: Shannan Neves MD   Discharging Provider: Cira Myers PA-C  Primary Care Provider: Elijah Velarde MD    HPI:   37-year-old female referred for pelvic abscess.  The patient reports lower pelvic abdominal pain for a week.  She endorses intermittent constipation with passing some liquid bowel movements.  She denies any fevers, chills, urinary difficulties.    * No surgery found *      Indwelling Lines/Drains at time of discharge:   Lines/Drains/Airways     Drain                 Closed/Suction Drain 09/07/18 1513 Dorsal;Left;Posterior Buttock Bulb 8.3 Fr. 2 days              Hospital Course: HD 1  pain slightly improved.  No bowel movement, IR drain placed yesterday with purulent drainage  Hospital day 2 no bowel movement, pain improved, tolerating clears  HD3: +bm, pain improved, tolerating regular diet. She was examined and found to be fit for discharge.     Consults:   Consults (From admission, onward)        Status Ordering Provider     Consult to Case Management/Social Work  Once     Provider:  (Not yet assigned)    Completed SHANNAN NEVES     Inpatient consult to Internal Medicine  Once     Provider:  (Not yet assigned)    Completed SHANNAN NEVES          Significant Diagnostic Studies: Labs:   CMP   Recent Labs   Lab  09/09/18   1343  09/10/18   0429   NA  139  141   K  3.4*  3.2*   CL  100  100   CO2  27  30*   GLU  99  91   BUN  7  7   CREATININE  0.8  0.7   CALCIUM  8.6*  8.5*   ANIONGAP  12  11   ESTGFRAFRICA  >60  >60   EGFRNONAA  >60  >60    and CBC   Recent Labs   Lab  09/09/18   0841  09/10/18   0429   WBC  13.65*  10.58   HGB  7.5*  7.7*   HCT  24.2*  24.5*   PLT  515*  551*     Radiology: CT with pelvic abscess, diverticulitis    Pending Diagnostic  Studies:     None        Final Active Diagnoses:    Diagnosis Date Noted POA    PRINCIPAL PROBLEM:  Diverticulitis of colon with perforation [K57.20] 09/07/2018 Yes    Pneumonia due to infectious organism [J18.9] 09/08/2018 Yes    Pelvic abscess in female [N73.9] 09/07/2018 Yes    Essential hypertension [I10] 09/07/2018 Yes    Hypokalemia [E87.6] 09/07/2018 Yes      Problems Resolved During this Admission:    Diagnosis Date Noted Date Resolved POA    Peritoneal abscess [K65.1] 09/07/2018 09/07/2018 Unknown      Discharged Condition: good    Disposition: Home-Health Care Svc    Follow Up:  Follow-up Information     Daniel Romeo MD In 1 week.    Specialties:  General Surgery, Bariatrics  Why:  For drain check  Contact information:  [address] [place] [address] [address] [address] [phone]                 Patient Instructions:      Ambulatory referral to Home Health   Referral Priority: Routine Referral Type: Home Health   Referral Reason: Specialty Services Required   Requested Specialty: Home Health Services   Number of Visits Requested: 1     Diet Adult Regular     Lifting restrictions   Order Comments: 20lb limit     No driving until:   Order Comments: No longer taking narcotic pain medication     Notify your health care provider if you experience any of the following:  temperature >100.4     Notify your health care provider if you experience any of the following:  persistent nausea and vomiting or diarrhea     Notify your health care provider if you experience any of the following:  severe uncontrolled pain     Notify your health care provider if you experience any of the following:  redness, tenderness, or signs of infection (pain, swelling, redness, odor or green/yellow discharge around incision site)     Notify your health care provider if you experience any of the following:  difficulty breathing or increased cough     Activity as tolerated     Medications:  Reconciled Home Medications:       Medication List      START taking these medications    amoxicillin-clavulanate 875-125mg 875-125 mg per tablet  Commonly known as:  AUGMENTIN  Take 1 tablet by mouth 2 (two) times daily. for 10 days     HYDROcodone-acetaminophen 5-325 mg per tablet  Commonly known as:  NORCO  Take 2 tablets by mouth every 4 (four) hours as needed for Pain.        CONTINUE taking these medications    docusate sodium 100 MG capsule  Commonly known as:  COLACE  Take 1 capsule (100 mg total) by mouth 2 (two) times daily.     olmesartan-hydrochlorothiazide 40-25 mg per tablet  Commonly known as:  BENICAR HCT  Take 1 tablet by mouth once daily.        STOP taking these medications    polyethylene glycol 17 gram/dose powder  Commonly known as:  GLYCOLAX          Time spent on the discharge of patient: 30 minutes    Cira Myers PA-C  General Surgery  [facility] -    Pt. with DDRT

## 2023-02-23 NOTE — OP NOTE
Sterile technique was performed in the posterior pelvis, lidocaine was used as a local anesthetic.  8.3 German drainage catheter placed and approx 45 ccs of purulent fluid removed and sent to lab.  Catheter secured with suture and tegaderm dressing.  Pt tolerated the procedure well without immediate complications.  Please see radiologist report for details. F/u with PCP and/or ordering physician.      Carac Counseling:  I discussed with the patient the risks of Carac including but not limited to erythema, scaling, itching, weeping, crusting, and pain.

## 2023-04-14 ENCOUNTER — HOSPITAL ENCOUNTER (EMERGENCY)
Facility: HOSPITAL | Age: 43
Discharge: HOME | End: 2023-04-14
Attending: EMERGENCY MEDICINE
Payer: MEDICAID

## 2023-04-14 VITALS
RESPIRATION RATE: 16 BRPM | SYSTOLIC BLOOD PRESSURE: 141 MMHG | DIASTOLIC BLOOD PRESSURE: 72 MMHG | BODY MASS INDEX: 43.3 KG/M2 | WEIGHT: 253.63 LBS | HEART RATE: 84 BPM | OXYGEN SATURATION: 95 % | HEIGHT: 64 IN | TEMPERATURE: 101 F

## 2023-04-14 DIAGNOSIS — J02.0 STREP PHARYNGITIS: ICD-10-CM

## 2023-04-14 DIAGNOSIS — J02.9 SORE THROAT: Primary | ICD-10-CM

## 2023-04-14 LAB — GROUP A STREP, MOLECULAR: POSITIVE

## 2023-04-14 PROCEDURE — 87651 STREP A DNA AMP PROBE: CPT | Performed by: NURSE PRACTITIONER

## 2023-04-14 PROCEDURE — 25000003 PHARM REV CODE 250: Performed by: NURSE PRACTITIONER

## 2023-04-14 PROCEDURE — 99283 EMERGENCY DEPT VISIT LOW MDM: CPT

## 2023-04-14 RX ORDER — AMOXICILLIN 500 MG/1
500 TABLET, FILM COATED ORAL EVERY 12 HOURS
Qty: 20 TABLET | Refills: 0 | Status: SHIPPED | OUTPATIENT
Start: 2023-04-14 | End: 2023-04-24

## 2023-04-14 RX ORDER — ACETAMINOPHEN 325 MG/1
650 TABLET ORAL
Status: COMPLETED | OUTPATIENT
Start: 2023-04-14 | End: 2023-04-14

## 2023-04-14 RX ADMIN — ACETAMINOPHEN 650 MG: 325 TABLET ORAL at 02:04

## 2023-04-14 NOTE — ED PROVIDER NOTES
HISTORY     Chief Complaint   Patient presents with    Sore Throat     Pt c/o sore throat and difficulty swallowing.  Symptoms began yesterday.     Review of patient's allergies indicates:  No Known Allergies     HPI   The history is provided by the patient. No  was used.   Sore Throat  This is a new problem. The current episode started yesterday. The problem occurs constantly. Pertinent negatives include no chest pain, no abdominal pain, no headaches and no shortness of breath. The symptoms are aggravated by swallowing. She has tried nothing for the symptoms.      PCP: Elijah Velarde MD     Past Medical History:  Past Medical History:   Diagnosis Date    Hypertension         Past Surgical History:  Past Surgical History:   Procedure Laterality Date     SECTION, CLASSIC      x1    DILATION AND CURETTAGE OF UTERUS          Family History:  Family History   Problem Relation Age of Onset    Hypertension Unknown     Diabetes Unknown         Social History:  Social History     Tobacco Use    Smoking status: Never    Smokeless tobacco: Never   Substance and Sexual Activity    Alcohol use: Yes     Comment: occasion     Drug use: No    Sexual activity: Yes     Partners: Male         ROS   Review of Systems   Constitutional:  Positive for fever.   HENT:  Positive for sore throat.    Respiratory:  Negative for shortness of breath.    Cardiovascular:  Negative for chest pain.   Gastrointestinal:  Negative for abdominal pain and nausea.   Genitourinary:  Negative for dysuria.   Musculoskeletal:  Negative for back pain.   Skin:  Negative for rash.   Neurological:  Negative for weakness and headaches.   Hematological:  Does not bruise/bleed easily.     PHYSICAL EXAM     Initial Vitals [23 1425]   BP Pulse Resp Temp SpO2   (!) 141/72 84 16 (!) 100.9 °F (38.3 °C) 95 %      MAP       --           Physical Exam    Nursing note and vitals reviewed.  Constitutional: She appears well-developed  "and well-nourished. She is not diaphoretic. No distress.   HENT:   Head: Normocephalic and atraumatic.   Mouth/Throat: Oropharyngeal exudate (erythema present tonsils 2+.) present.   Eyes: Right eye exhibits no discharge. Left eye exhibits no discharge.   Neck: Neck supple.   Normal range of motion.  Cardiovascular:  Normal rate.           Pulmonary/Chest: No respiratory distress.   Abdominal: Abdomen is soft. She exhibits no distension.   Musculoskeletal:         General: Normal range of motion.      Cervical back: Normal range of motion and neck supple.     Neurological: She is alert and oriented to person, place, and time. She has normal strength.   Skin: Skin is warm and dry.   Psychiatric: She has a normal mood and affect. Her behavior is normal. Thought content normal.        ED COURSE   Procedures  ED ONGOING VITALS:  Vitals:    04/14/23 1425 04/14/23 1451   BP: (!) 141/72    Pulse: 84    Resp: 16    Temp: (!) 100.9 °F (38.3 °C) (!) 100.9 °F (38.3 °C)   TempSrc: Oral    SpO2: 95%    Weight: 115.1 kg (253 lb 10.2 oz)    Height: 5' 4" (1.626 m)          ABNORMAL LAB VALUES:  Labs Reviewed   GROUP A STREP, MOLECULAR - Abnormal; Notable for the following components:       Result Value    Group A Strep, Molecular Positive (*)     All other components within normal limits   HIV 1 / 2 ANTIBODY   HEPATITIS C ANTIBODY   HEP C VIRUS HOLD SPECIMEN         ALL LAB VALUES:  Results for orders placed or performed during the hospital encounter of 04/14/23   Group A Strep, Molecular    Specimen: Throat   Result Value Ref Range    Group A Strep, Molecular Positive (A) Negative           RADIOLOGY STUDIES:  Imaging Results    None                   The above vital signs and test results have been reviewed by the emergency provider.     ED Medications:  Medications   acetaminophen tablet 650 mg (650 mg Oral Given 4/14/23 1451)       Discharge Medication List as of 4/14/2023  3:56 PM        START taking these medications    " Details   amoxicillin (AMOXIL) 500 MG Tab Take 1 tablet (500 mg total) by mouth every 12 (twelve) hours. for 10 days, Starting Fri 4/14/2023, Until Mon 4/24/2023, Print           Discharge Medications:  Discharge Medication List as of 4/14/2023  3:56 PM        START taking these medications    Details   amoxicillin (AMOXIL) 500 MG Tab Take 1 tablet (500 mg total) by mouth every 12 (twelve) hours. for 10 days, Starting Fri 4/14/2023, Until Mon 4/24/2023, Print             Follow-up Information       pcp of choice.    Why: As needed             [facility] - Emergency Dept..    Specialty: Emergency Medicine  Why: If symptoms worsen  Contact information:  [address] [facility] [address] [phone]                          I discussed with patient and/or family/caretaker that evaluation in the ED does not suggest any emergent or life threatening medical conditions requiring immediate intervention beyond what was provided in the ED, and I believe patient is safe for discharge. Regardless, an unremarkable evaluation in the ED does not preclude the development or presence of a serious or life threatening condition. As such, patient was instructed to return immediately for any worsening or change in current symptoms.        MEDICAL DECISION MAKING   Medical Decision Making:   Initial Assessment:   Patient presents to the ER for sore throat and fever.  Patient reports last Tylenol dose was yesterday.  Differential Diagnosis:   Viral pharyngitis  Mononucleosis  Peritonsillar abscess  Clinical Tests:   Lab Tests: Ordered and Reviewed  ED Management:  Due to Bicillin shortage patient will be treated with amoxicillin twice a day for 10 days outpatient.  Patient to follow-up with her PCP and return to the ER for any worsening or concerns.             CLINICAL IMPRESSION       ICD-10-CM ICD-9-CM   1. Sore throat  J02.9 462   2. Strep pharyngitis  J02.0 034.0       Disposition:   Disposition:  Discharged  Condition: Stable       Lambert Dodson, CAROLYN  04/14/23 5125

## 2023-04-25 ENCOUNTER — HOSPITAL ENCOUNTER (EMERGENCY)
Facility: HOSPITAL | Age: 43
Discharge: HOME | End: 2023-04-25
Attending: EMERGENCY MEDICINE
Payer: MEDICAID

## 2023-04-25 VITALS
WEIGHT: 255.31 LBS | HEART RATE: 74 BPM | TEMPERATURE: 99 F | BODY MASS INDEX: 43.82 KG/M2 | RESPIRATION RATE: 18 BRPM | SYSTOLIC BLOOD PRESSURE: 148 MMHG | OXYGEN SATURATION: 98 % | DIASTOLIC BLOOD PRESSURE: 75 MMHG
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
## 2023-04-25 DIAGNOSIS — M25.512 ACUTE PAIN OF LEFT SHOULDER: ICD-10-CM

## 2023-04-25 PROCEDURE — 99284 EMERGENCY DEPT VISIT MOD MDM: CPT

## 2023-04-25 RX ORDER — METHOCARBAMOL 500 MG/1
500 TABLET, FILM COATED ORAL 3 TIMES DAILY
Qty: 15 TABLET | Refills: 0 | Status: SHIPPED | OUTPATIENT
Start: 2023-04-25 | End: 2023-04-30

## 2023-04-25 RX ORDER — DICLOFENAC SODIUM 50 MG/1
50 TABLET, DELAYED RELEASE ORAL 2 TIMES DAILY
Qty: 20 TABLET | Refills: 0 | Status: SHIPPED | OUTPATIENT
Start: 2023-04-25
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
## 2023-04-25 NOTE — Clinical Note
"Lulú "Lulú" Rolo was seen and treated in our emergency department on 4/25/2023.  She may return to work on 04/26/2023.       If you have any questions or concerns, please don't hesitate to call.      Hermelindo Wallace Jr., FNP"

## 2023-04-26 ENCOUNTER — PATIENT OUTREACH (OUTPATIENT)
Dept: EMERGENCY MEDICINE | Facility: HOSPITAL | Age: 43
End: 2023-04-26
Payer: MEDICAID
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
## 2023-08-14 ENCOUNTER — PATIENT OUTREACH (OUTPATIENT)
Dept: EMERGENCY MEDICINE | Facility: HOSPITAL | Age: 43
End: 2023-08-14

## 2023-11-16 ENCOUNTER — PATIENT OUTREACH (OUTPATIENT)
Dept: EMERGENCY MEDICINE | Facility: HOSPITAL | Age: 43
End: 2023-11-16
Payer: MEDICAID

## 2024-01-09 ENCOUNTER — PATIENT OUTREACH (OUTPATIENT)
Dept: EMERGENCY MEDICINE | Facility: HOSPITAL | Age: 44
End: 2024-01-09
Payer: MEDICAID